# Patient Record
Sex: FEMALE | Race: WHITE
[De-identification: names, ages, dates, MRNs, and addresses within clinical notes are randomized per-mention and may not be internally consistent; named-entity substitution may affect disease eponyms.]

---

## 2022-02-04 ENCOUNTER — APPOINTMENT (OUTPATIENT)
Dept: OPHTHALMOLOGY | Facility: CLINIC | Age: 88
End: 2022-02-04
Payer: MEDICARE

## 2022-02-04 ENCOUNTER — NON-APPOINTMENT (OUTPATIENT)
Age: 88
End: 2022-02-04

## 2022-02-04 PROCEDURE — 92014 COMPRE OPH EXAM EST PT 1/>: CPT

## 2023-02-17 ENCOUNTER — APPOINTMENT (OUTPATIENT)
Dept: OPHTHALMOLOGY | Facility: CLINIC | Age: 89
End: 2023-02-17
Payer: MEDICARE

## 2023-02-17 ENCOUNTER — NON-APPOINTMENT (OUTPATIENT)
Age: 89
End: 2023-02-17

## 2023-02-17 PROCEDURE — 92014 COMPRE OPH EXAM EST PT 1/>: CPT

## 2023-07-14 PROBLEM — Z00.00 ENCOUNTER FOR PREVENTIVE HEALTH EXAMINATION: Status: ACTIVE | Noted: 2023-07-14

## 2025-03-27 ENCOUNTER — INPATIENT (INPATIENT)
Facility: HOSPITAL | Age: 89
LOS: 6 days | Discharge: NURSING, REHAB OR LONG-TERM CARE | End: 2025-04-03
Attending: STUDENT IN AN ORGANIZED HEALTH CARE EDUCATION/TRAINING PROGRAM | Admitting: STUDENT IN AN ORGANIZED HEALTH CARE EDUCATION/TRAINING PROGRAM
Payer: MEDICARE

## 2025-03-27 VITALS — HEART RATE: 68 BPM

## 2025-03-27 PROCEDURE — 99223 1ST HOSP IP/OBS HIGH 75: CPT

## 2025-03-27 RX ORDER — POVIDONE-IODINE 7.5 %
1 SOLUTION, NON-ORAL TOPICAL ONCE
Refills: 0 | Status: DISCONTINUED | OUTPATIENT
Start: 2025-03-27 | End: 2025-03-31

## 2025-03-27 NOTE — H&P ADULT - PROBLEM SELECTOR PLAN 6
- Home med januvia, glimepiride  - ISS + FS q6hrs - Home med januvia, glimepiride  - A1c 9.8 at [facility]  - ISS + FS q6hrs

## 2025-03-27 NOTE — H&P ADULT - TIME BILLING
Direct patient care (interview and examination of patient), discussion with other providers, support staff and/or patient's family members, explaining in detail the diagnosis and plan to the patient and/or family members; review of medical records, ordering diagnostic tests, personally reviewing radiologic and diagnostic tests mentioned above, analyzing results, and documentation. This excludes teaching time to any staff, family, or patient.

## 2025-03-27 NOTE — H&P ADULT - HISTORY OF PRESENT ILLNESS
Patient is a 89F, hard of hearing and uses hearing aid, with PMHx of DM, chronic Afib on Eliquis, HTN, HLD, b/l hip replacements, who comes in after a fall 3/26/25. Patient was using her walker in the bedroom and tripped in her slipper and fell. Since then, she has R leg and knee pain. She is tired due to lack of sleep. Patient denies chest pain, shortness of breath, cough, abdominal pain, diarrhea, constipation, dysuria.

## 2025-03-27 NOTE — CONSULT NOTE ADULT - SUBJECTIVE AND OBJECTIVE BOX
Pt is a 89y Female who presents as transfer from [facility] with R distal femur fx after trip and fall yesterday. Denies head injury, LOC, other injuries. Was unable to ambulate after due to R knee pain. Ambulates with walker at baseline. Denies numbness, tingling, fevers, chills, CP, SOB, N/V/D.    Vital Signs (24 Hrs):  T(C): --  HR: --  BP: --  RR: --  SpO2: --    LABS:      Physical Exam:  General: NAD, pt laying in bed comfortably    RLE:  Skin intact, TTP RLE, knee brace  Compartments soft, compressible  Calves nontender  Motor: +GSC, TA, EHL, FHL  SILT: SPN, DPN, Tib, Saph, Kingsley  +DP    Secondary Assessment:  NC/AT, NTTP of clavicles, NTTP of C-,T-,L-Spine, NTTP of Pelvis  UEs: NTTP of Shoulders, Elbows, Wrists, Hands; NT with AROM/PROM of Shoulders, Elbows, Wrists, Hands; AIN/PIN/Med/Uln/Msc/Rad/Ax intact  LE: Able to SLR, NT with Log Roll, NT with Heel Strike, NTTP of Hip, Knee, Ankle, Foot; NT with AROM/PROM of Hip, Knee, Ankle, Foot; Q/H/Gsc/TA/EHL/FHL intact    Imaging:  XR R Femur: distal femur fx    A/P:  89y Female who presents with R distal femur fx    Pt admitted to medicine team (medicine to medicine transfer)  Plan for OR tomorrow with Dr. De La O  NWB RLE, knee brace  Analgesics  NPO after midnight, except medications  Followup AM labs  Hold chemical DVT ppx after midnight, SCDs OK  Please document medical clearance/optimization for OR  Discussed plan with Dr. De La O who agrees with above

## 2025-03-27 NOTE — H&P ADULT - PROBLEM SELECTOR PLAN 1
- P/w mechanical fall, with RLE pain  - [[facility]] CT R femur 3/26/25 = Extensively comminuted fracture of the distal femur with intra-articular extension through the trochlear sulcus. Moderate knee lipohemarthrosis. Moderate soft tissue and muscular edema surrounding the fracture site. Moderate iliopsoas bursitis.  - [[facility]] Xray pelvis/R femur/R knee 3/26/25 = Mildly displaced, oblique distal right femur fracture. Bilateral total hip arthroplasties without hardware fracture or periprosthetic loosening.  - [[facility]] CXR 3/26/25 = No focal consolidation  - Pain control  - *Please refer to [facility] cardiac consultation note on 3/26/25 for preop evaluation on [facility]  - Orthopedics on board = OR 3/28 - P/w mechanical fall, with RLE pain  - [[facility]] CT R femur 3/26/25 = Extensively comminuted fracture of the distal femur with intra-articular extension through the trochlear sulcus. Moderate knee lipohemarthrosis. Moderate soft tissue and muscular edema surrounding the fracture site. Moderate iliopsoas bursitis.  - [[facility]] Xray pelvis/R femur/R knee 3/26/25 = Mildly displaced, oblique distal right femur fracture. Bilateral total hip arthroplasties without hardware fracture or periprosthetic loosening.  - [[facility]] CXR 3/26/25 = No focal consolidation  - Pain control  - *Please refer to [facility] cardiac consultation note on 3/27/25 for preop evaluation on [facility]. C/w orthopedics resident  - Orthopedics on board = OR 3/28 - P/w mechanical fall, with RLE pain  - [[facility]] CT R femur 3/26/25 = Extensively comminuted fracture of the distal femur with intra-articular extension through the trochlear sulcus. Moderate knee lipohemarthrosis. Moderate soft tissue and muscular edema surrounding the fracture site. Moderate iliopsoas bursitis.  - [[facility]] Xray pelvis/R femur/R knee 3/26/25 = Mildly displaced, oblique distal right femur fracture. Bilateral total hip arthroplasties without hardware fracture or periprosthetic loosening.  - [[facility]] CXR 3/26/25 = No focal consolidation  - Pain control  - *Please refer to [facility] cardiac consultation note on 3/27/25 for preop evaluation on [facility].  - Orthopedics on board = OR 3/28

## 2025-03-27 NOTE — H&P ADULT - NSHPPHYSICALEXAM_GEN_ALL_CORE
GENERAL: NAD  HEAD: Atraumatic, Normocephalic  EYES: EOMI. Conjunctiva and sclera clear  NECK: Supple  CHEST/LUNG: Clear to auscultation bilaterally; No wheeze, rhonchi, rales  HEART: Regular rate and rhythm; No murmurs  ABDOMEN: Soft, Nontender, Nondistended; Bowel sounds present  EXTREMITIES: BL LE edema  NEURO: AAOx3

## 2025-03-27 NOTE — H&P ADULT - PROBLEM SELECTOR PLAN 4
- C/w home ramipril, torsemide, and potassium chloride - C/w home ramipril  - Per [place] note, "Pt reported not taking Torsemide for 11+ days d/t frequency of urination; Bilateral ankle/leg pitting edema/swelling ensued". She refused torsemide at [facility].  - Holding torsemide, and potassium chloride going into the surgery as patient is NPO

## 2025-03-27 NOTE — H&P ADULT - ASSESSMENT
Patient is a 89F, with PMHx of DM, chronic Afib on Eliquis, HTN, HLD, b/l hip replacements, who comes in after a fall 3/26/25. Transferred from [facility] for operation. Patient is a 89F, with PMHx of DM, chronic Afib on Eliquis, HTN, HLD, b/l hip replacements, who comes in after a fall 3/26/25. Transferred from [facility] for operation.    Med list obtained from [facility] notes. Patient does not have her med list right now but she said it was given at [facility], so their records should be accurate.

## 2025-03-28 ENCOUNTER — RESULT REVIEW (OUTPATIENT)
Age: 89
End: 2025-03-28

## 2025-03-28 DIAGNOSIS — I10 ESSENTIAL (PRIMARY) HYPERTENSION: ICD-10-CM

## 2025-03-28 DIAGNOSIS — Z29.9 ENCOUNTER FOR PROPHYLACTIC MEASURES, UNSPECIFIED: ICD-10-CM

## 2025-03-28 DIAGNOSIS — I48.20 CHRONIC ATRIAL FIBRILLATION, UNSPECIFIED: ICD-10-CM

## 2025-03-28 DIAGNOSIS — E78.5 HYPERLIPIDEMIA, UNSPECIFIED: ICD-10-CM

## 2025-03-28 DIAGNOSIS — R26.2 DIFFICULTY IN WALKING, NOT ELSEWHERE CLASSIFIED: ICD-10-CM

## 2025-03-28 DIAGNOSIS — E11.9 TYPE 2 DIABETES MELLITUS WITHOUT COMPLICATIONS: ICD-10-CM

## 2025-03-28 LAB
ALBUMIN SERPL ELPH-MCNC: 2.1 G/DL — LOW (ref 3.3–5)
ALP SERPL-CCNC: 84 U/L — SIGNIFICANT CHANGE UP (ref 40–120)
ALT FLD-CCNC: 15 U/L — SIGNIFICANT CHANGE UP (ref 12–78)
ANION GAP SERPL CALC-SCNC: 2 MMOL/L — LOW (ref 5–17)
ANION GAP SERPL CALC-SCNC: 2 MMOL/L — LOW (ref 5–17)
APTT BLD: 18.7 SEC — LOW (ref 24.5–35.6)
APTT BLD: 25.1 SEC — SIGNIFICANT CHANGE UP (ref 24.5–35.6)
AST SERPL-CCNC: 7 U/L — LOW (ref 15–37)
BILIRUB SERPL-MCNC: 0.4 MG/DL — SIGNIFICANT CHANGE UP (ref 0.2–1.2)
BUN SERPL-MCNC: 19 MG/DL — SIGNIFICANT CHANGE UP (ref 7–23)
BUN SERPL-MCNC: 20 MG/DL — SIGNIFICANT CHANGE UP (ref 7–23)
CALCIUM SERPL-MCNC: 8.9 MG/DL — SIGNIFICANT CHANGE UP (ref 8.5–10.1)
CALCIUM SERPL-MCNC: 9.2 MG/DL — SIGNIFICANT CHANGE UP (ref 8.5–10.1)
CHLORIDE SERPL-SCNC: 98 MMOL/L — SIGNIFICANT CHANGE UP (ref 96–108)
CHLORIDE SERPL-SCNC: 99 MMOL/L — SIGNIFICANT CHANGE UP (ref 96–108)
CO2 SERPL-SCNC: 33 MMOL/L — HIGH (ref 22–31)
CO2 SERPL-SCNC: 33 MMOL/L — HIGH (ref 22–31)
CREAT SERPL-MCNC: 0.67 MG/DL — SIGNIFICANT CHANGE UP (ref 0.5–1.3)
CREAT SERPL-MCNC: 0.84 MG/DL — SIGNIFICANT CHANGE UP (ref 0.5–1.3)
EGFR: 66 ML/MIN/1.73M2 — SIGNIFICANT CHANGE UP
EGFR: 66 ML/MIN/1.73M2 — SIGNIFICANT CHANGE UP
EGFR: 84 ML/MIN/1.73M2 — SIGNIFICANT CHANGE UP
EGFR: 84 ML/MIN/1.73M2 — SIGNIFICANT CHANGE UP
GLUCOSE BLDC GLUCOMTR-MCNC: 141 MG/DL — HIGH (ref 70–99)
GLUCOSE BLDC GLUCOMTR-MCNC: 144 MG/DL — HIGH (ref 70–99)
GLUCOSE BLDC GLUCOMTR-MCNC: 146 MG/DL — HIGH (ref 70–99)
GLUCOSE BLDC GLUCOMTR-MCNC: 178 MG/DL — HIGH (ref 70–99)
GLUCOSE BLDC GLUCOMTR-MCNC: 185 MG/DL — HIGH (ref 70–99)
GLUCOSE SERPL-MCNC: 164 MG/DL — HIGH (ref 70–99)
GLUCOSE SERPL-MCNC: 170 MG/DL — HIGH (ref 70–99)
HCT VFR BLD CALC: 25.5 % — LOW (ref 34.5–45)
HCT VFR BLD CALC: 28.4 % — LOW (ref 34.5–45)
HGB BLD-MCNC: 8.4 G/DL — LOW (ref 11.5–15.5)
HGB BLD-MCNC: 9.3 G/DL — LOW (ref 11.5–15.5)
INR BLD: 0.99 RATIO — SIGNIFICANT CHANGE UP (ref 0.85–1.16)
INR BLD: 1.02 RATIO — SIGNIFICANT CHANGE UP (ref 0.85–1.16)
MCHC RBC-ENTMCNC: 30.3 PG — SIGNIFICANT CHANGE UP (ref 27–34)
MCHC RBC-ENTMCNC: 30.4 PG — SIGNIFICANT CHANGE UP (ref 27–34)
MCHC RBC-ENTMCNC: 32.7 G/DL — SIGNIFICANT CHANGE UP (ref 32–36)
MCHC RBC-ENTMCNC: 32.9 G/DL — SIGNIFICANT CHANGE UP (ref 32–36)
MCV RBC AUTO: 92.1 FL — SIGNIFICANT CHANGE UP (ref 80–100)
MCV RBC AUTO: 92.8 FL — SIGNIFICANT CHANGE UP (ref 80–100)
NRBC BLD AUTO-RTO: 0 /100 WBCS — SIGNIFICANT CHANGE UP (ref 0–0)
NRBC BLD AUTO-RTO: 0 /100 WBCS — SIGNIFICANT CHANGE UP (ref 0–0)
PLATELET # BLD AUTO: 265 K/UL — SIGNIFICANT CHANGE UP (ref 150–400)
PLATELET # BLD AUTO: 292 K/UL — SIGNIFICANT CHANGE UP (ref 150–400)
POTASSIUM SERPL-MCNC: 4.1 MMOL/L — SIGNIFICANT CHANGE UP (ref 3.5–5.3)
POTASSIUM SERPL-MCNC: 4.4 MMOL/L — SIGNIFICANT CHANGE UP (ref 3.5–5.3)
POTASSIUM SERPL-SCNC: 4.1 MMOL/L — SIGNIFICANT CHANGE UP (ref 3.5–5.3)
POTASSIUM SERPL-SCNC: 4.4 MMOL/L — SIGNIFICANT CHANGE UP (ref 3.5–5.3)
PROT SERPL-MCNC: 5.4 GM/DL — LOW (ref 6–8.3)
PROTHROM AB SERPL-ACNC: 11.5 SEC — SIGNIFICANT CHANGE UP (ref 9.9–13.4)
PROTHROM AB SERPL-ACNC: 11.8 SEC — SIGNIFICANT CHANGE UP (ref 9.9–13.4)
RBC # BLD: 2.77 M/UL — LOW (ref 3.8–5.2)
RBC # BLD: 3.06 M/UL — LOW (ref 3.8–5.2)
RBC # FLD: 16.6 % — HIGH (ref 10.3–14.5)
RBC # FLD: 17 % — HIGH (ref 10.3–14.5)
SODIUM SERPL-SCNC: 133 MMOL/L — LOW (ref 135–145)
SODIUM SERPL-SCNC: 134 MMOL/L — LOW (ref 135–145)
WBC # BLD: 8.72 K/UL — SIGNIFICANT CHANGE UP (ref 3.8–10.5)
WBC # BLD: 8.85 K/UL — SIGNIFICANT CHANGE UP (ref 3.8–10.5)
WBC # FLD AUTO: 8.72 K/UL — SIGNIFICANT CHANGE UP (ref 3.8–10.5)
WBC # FLD AUTO: 8.85 K/UL — SIGNIFICANT CHANGE UP (ref 3.8–10.5)

## 2025-03-28 PROCEDURE — 93010 ELECTROCARDIOGRAM REPORT: CPT

## 2025-03-28 PROCEDURE — 73562 X-RAY EXAM OF KNEE 3: CPT | Mod: 26,RT

## 2025-03-28 PROCEDURE — 93306 TTE W/DOPPLER COMPLETE: CPT | Mod: 26

## 2025-03-28 PROCEDURE — 99232 SBSQ HOSP IP/OBS MODERATE 35: CPT

## 2025-03-28 RX ORDER — GLIMEPIRIDE 4 MG/1
1 TABLET ORAL
Refills: 0 | DISCHARGE

## 2025-03-28 RX ORDER — INSULIN LISPRO 100 U/ML
INJECTION, SOLUTION INTRAVENOUS; SUBCUTANEOUS
Refills: 0 | Status: DISCONTINUED | OUTPATIENT
Start: 2025-03-28 | End: 2025-03-31

## 2025-03-28 RX ORDER — INSULIN LISPRO 100 U/ML
INJECTION, SOLUTION INTRAVENOUS; SUBCUTANEOUS AT BEDTIME
Refills: 0 | Status: DISCONTINUED | OUTPATIENT
Start: 2025-03-28 | End: 2025-03-31

## 2025-03-28 RX ORDER — DILTIAZEM HYDROCHLORIDE 240 MG/1
1 TABLET, EXTENDED RELEASE ORAL
Refills: 0 | DISCHARGE

## 2025-03-28 RX ORDER — ATORVASTATIN CALCIUM 80 MG/1
1 TABLET, FILM COATED ORAL
Refills: 0 | DISCHARGE

## 2025-03-28 RX ORDER — SODIUM CHLORIDE 9 G/1000ML
1000 INJECTION, SOLUTION INTRAVENOUS
Refills: 0 | Status: DISCONTINUED | OUTPATIENT
Start: 2025-03-28 | End: 2025-03-28

## 2025-03-28 RX ORDER — GABAPENTIN 400 MG/1
100 CAPSULE ORAL
Refills: 0 | Status: DISCONTINUED | OUTPATIENT
Start: 2025-03-28 | End: 2025-03-31

## 2025-03-28 RX ORDER — ATORVASTATIN CALCIUM 80 MG/1
20 TABLET, FILM COATED ORAL AT BEDTIME
Refills: 0 | Status: DISCONTINUED | OUTPATIENT
Start: 2025-03-28 | End: 2025-03-31

## 2025-03-28 RX ORDER — ENOXAPARIN SODIUM 100 MG/ML
60 INJECTION SUBCUTANEOUS EVERY 12 HOURS
Refills: 0 | Status: COMPLETED | OUTPATIENT
Start: 2025-03-28 | End: 2025-03-30

## 2025-03-28 RX ORDER — GABAPENTIN 400 MG/1
300 CAPSULE ORAL
Refills: 0 | Status: DISCONTINUED | OUTPATIENT
Start: 2025-03-28 | End: 2025-03-31

## 2025-03-28 RX ORDER — RAMIPRIL 2.5 MG/1
1 CAPSULE ORAL
Refills: 0 | DISCHARGE

## 2025-03-28 RX ORDER — GABAPENTIN 400 MG/1
200 CAPSULE ORAL
Refills: 0 | Status: DISCONTINUED | OUTPATIENT
Start: 2025-03-28 | End: 2025-03-31

## 2025-03-28 RX ORDER — DEXTROSE 50 % IN WATER 50 %
50 SYRINGE (ML) INTRAVENOUS ONCE
Refills: 0 | Status: DISCONTINUED | OUTPATIENT
Start: 2025-03-28 | End: 2025-03-31

## 2025-03-28 RX ORDER — ACETAMINOPHEN 500 MG/5ML
650 LIQUID (ML) ORAL EVERY 6 HOURS
Refills: 0 | Status: DISCONTINUED | OUTPATIENT
Start: 2025-03-28 | End: 2025-03-31

## 2025-03-28 RX ORDER — DILTIAZEM HYDROCHLORIDE 240 MG/1
240 TABLET, EXTENDED RELEASE ORAL DAILY
Refills: 0 | Status: DISCONTINUED | OUTPATIENT
Start: 2025-03-28 | End: 2025-03-31

## 2025-03-28 RX ORDER — GLUCAGON 3 MG/1
1 POWDER NASAL ONCE
Refills: 0 | Status: DISCONTINUED | OUTPATIENT
Start: 2025-03-28 | End: 2025-03-31

## 2025-03-28 RX ORDER — HEPARIN SODIUM 1000 [USP'U]/ML
5000 INJECTION INTRAVENOUS; SUBCUTANEOUS ONCE
Refills: 0 | Status: DISCONTINUED | OUTPATIENT
Start: 2025-03-28 | End: 2025-03-28

## 2025-03-28 RX ORDER — LISINOPRIL 5 MG/1
40 TABLET ORAL DAILY
Refills: 0 | Status: DISCONTINUED | OUTPATIENT
Start: 2025-03-28 | End: 2025-03-31

## 2025-03-28 RX ORDER — APIXABAN 2.5 MG/1
1 TABLET, FILM COATED ORAL
Refills: 0 | DISCHARGE

## 2025-03-28 RX ORDER — SITAGLIPTIN 100 MG/1
1 TABLET, FILM COATED ORAL
Refills: 0 | DISCHARGE

## 2025-03-28 RX ORDER — TORSEMIDE 10 MG
40 TABLET ORAL DAILY
Refills: 0 | Status: DISCONTINUED | OUTPATIENT
Start: 2025-03-28 | End: 2025-03-31

## 2025-03-28 RX ORDER — MELATONIN 5 MG
3 TABLET ORAL AT BEDTIME
Refills: 0 | Status: DISCONTINUED | OUTPATIENT
Start: 2025-03-28 | End: 2025-03-31

## 2025-03-28 RX ORDER — VIBEGRON 75 MG/1
1 TABLET, FILM COATED ORAL
Refills: 0 | DISCHARGE

## 2025-03-28 RX ORDER — MAGNESIUM, ALUMINUM HYDROXIDE 200-200 MG
30 TABLET,CHEWABLE ORAL EVERY 4 HOURS
Refills: 0 | Status: DISCONTINUED | OUTPATIENT
Start: 2025-03-28 | End: 2025-03-31

## 2025-03-28 RX ADMIN — INSULIN LISPRO 1: 100 INJECTION, SOLUTION INTRAVENOUS; SUBCUTANEOUS at 10:25

## 2025-03-28 RX ADMIN — ATORVASTATIN CALCIUM 20 MILLIGRAM(S): 80 TABLET, FILM COATED ORAL at 21:29

## 2025-03-28 RX ADMIN — GABAPENTIN 100 MILLIGRAM(S): 400 CAPSULE ORAL at 13:13

## 2025-03-28 RX ADMIN — ENOXAPARIN SODIUM 60 MILLIGRAM(S): 100 INJECTION SUBCUTANEOUS at 21:28

## 2025-03-28 RX ADMIN — GABAPENTIN 200 MILLIGRAM(S): 400 CAPSULE ORAL at 05:34

## 2025-03-28 RX ADMIN — SODIUM CHLORIDE 50 MILLILITER(S): 9 INJECTION, SOLUTION INTRAVENOUS at 05:33

## 2025-03-28 RX ADMIN — DILTIAZEM HYDROCHLORIDE 240 MILLIGRAM(S): 240 TABLET, EXTENDED RELEASE ORAL at 05:34

## 2025-03-28 RX ADMIN — GABAPENTIN 300 MILLIGRAM(S): 400 CAPSULE ORAL at 21:29

## 2025-03-28 NOTE — CHART NOTE - NSCHARTNOTEFT_GEN_A_CORE
After lengthy discussion with patient and Dr De La O, patient elects to hold off on surgery at this time and would like to think about it over the weekend. Patient is pleasant an AxO4, able to cooperate with exam and answer questions appropriately. Plan for conservative management at this time with potential operative intervention in the future.     -May resume diet at this time  -Will continue to follow patient and reassess operative management this weekend  -DVT prophylaxis per primary team, okay to resume today  -NWB RLE, strict bed rest at this time  -BJKI for comfort while in bed  -Discussed with Dr De La O who agrees.

## 2025-03-28 NOTE — PROGRESS NOTE ADULT - SUBJECTIVE AND OBJECTIVE BOX
89F hard of hearing and uses hearing aid with PMHx of DM, chronic Afib on Eliquis, HTN, HLD, b/l hip replacements who was transferred from [facility] for w/ a right femoral fracture after a fall 3/26/25. She is lying in bed in NAD.      MEDICATIONS  (STANDING):  atorvastatin 20 milliGRAM(s) Oral at bedtime  chlorhexidine 2% Cloths 1 Application(s) Topical once  dextrose 50% Injectable 50 Gram(s) IV Push once  dextrose 50% Injectable 50 Gram(s) IV Push once  diltiazem    milliGRAM(s) Oral daily  gabapentin 200 milliGRAM(s) Oral <User Schedule>  gabapentin 100 milliGRAM(s) Oral <User Schedule>  gabapentin 300 milliGRAM(s) Oral <User Schedule>  glucagon  Injectable 1 milliGRAM(s) IntraMuscular once  insulin lispro (ADMELOG) corrective regimen sliding scale   SubCutaneous three times a day before meals  insulin lispro (ADMELOG) corrective regimen sliding scale   SubCutaneous at bedtime  lactated ringers. 1000 milliLiter(s) (50 mL/Hr) IV Continuous <Continuous>  lisinopril 40 milliGRAM(s) Oral daily  povidone iodine 10% Nasal Swab 1 Application(s) Both Nostrils once    MEDICATIONS  (PRN):  acetaminophen     Tablet .. 650 milliGRAM(s) Oral every 6 hours PRN Temp greater or equal to 38C (100.4F), Mild Pain (1 - 3)  aluminum hydroxide/magnesium hydroxide/simethicone Suspension 30 milliLiter(s) Oral every 4 hours PRN Dyspepsia  melatonin 3 milliGRAM(s) Oral at bedtime PRN Insomnia      Allergies    sulfa drugs (Rash)    Intolerances        Vital Signs Last 24 Hrs  T(C): 37.2 (28 Mar 2025 16:35), Max: 37.8 (28 Mar 2025 16:30)  T(F): 98.9 (28 Mar 2025 16:35), Max: 100 (28 Mar 2025 16:30)  HR: 68 (28 Mar 2025 16:35) (59 - 91)  BP: 121/63 (28 Mar 2025 16:35) (109/67 - 127/70)  BP(mean): 82 (28 Mar 2025 16:35) (82 - 82)  RR: 17 (28 Mar 2025 16:35) (16 - 18)  SpO2: 92% (28 Mar 2025 16:35) (91% - 95%)    Parameters below as of 28 Mar 2025 15:50  Patient On (Oxygen Delivery Method): room air        PHYSICAL EXAM:  GENERAL: NAD, well-groomed, well-developed  HEAD:  Atraumatic, Normocephalic  EYES: EOMI, PERRLA   NECK: Supple   NERVOUS SYSTEM:  Alert & Oriented X3   CHEST/LUNG: Clear to auscultation bilaterally; No rales, rhonchi, wheezing, or rubs  HEART: Regular rate and rhythm; No murmurs, rubs, or gallops  ABDOMEN: Soft, Nontender, Nondistended; Bowel sounds present  EXTREMITIES: bilateral LE edema     LABS:                        8.4    8.72  )-----------( 292      ( 28 Mar 2025 03:49 )             25.5     03-28    134[L]  |  99  |  19  ----------------------------<  164[H]  4.1   |  33[H]  |  0.67    Ca    8.9      28 Mar 2025 03:49    TPro  5.4[L]  /  Alb  2.1[L]  /  TBili  0.4  /  DBili  x   /  AST  7[L]  /  ALT  15  /  AlkPhos  84  03-28    PT/INR - ( 28 Mar 2025 03:49 )   PT: 11.8 sec;   INR: 1.02 ratio         PTT - ( 28 Mar 2025 03:49 )  PTT:25.1 sec  Urinalysis Basic - ( 28 Mar 2025 03:49 )    Color: x / Appearance: x / SG: x / pH: x  Gluc: 164 mg/dL / Ketone: x  / Bili: x / Urobili: x   Blood: x / Protein: x / Nitrite: x   Leuk Esterase: x / RBC: x / WBC x   Sq Epi: x / Non Sq Epi: x / Bacteria: x      CAPILLARY BLOOD GLUCOSE      POCT Blood Glucose.: 144 mg/dL (28 Mar 2025 19:18)  POCT Blood Glucose.: 146 mg/dL (28 Mar 2025 12:17)  POCT Blood Glucose.: 185 mg/dL (28 Mar 2025 08:10)  POCT Blood Glucose.: 178 mg/dL (28 Mar 2025 00:53)      RADIOLOGY & ADDITIONAL TESTS:

## 2025-03-28 NOTE — PATIENT PROFILE ADULT - FALL HARM RISK - HARM RISK INTERVENTIONS
Assistance with ambulation/Assistance OOB with selected safe patient handling equipment/Communicate Risk of Fall with Harm to all staff/Discuss with provider need for PT consult/Monitor gait and stability/Reinforce activity limits and safety measures with patient and family/Tailored Fall Risk Interventions/Visual Cue: Yellow wristband and red socks/Bed in lowest position, wheels locked, appropriate side rails in place/Call bell, personal items and telephone in reach/Instruct patient to call for assistance before getting out of bed or chair/Non-slip footwear when patient is out of bed/[place] to call system/Physically safe environment - no spills, clutter or unnecessary equipment/Purposeful Proactive Rounding/Room/bathroom lighting operational, light cord in reach

## 2025-03-28 NOTE — PROGRESS NOTE ADULT - SUBJECTIVE AND OBJECTIVE BOX
Pt seen at bedside this AM. No acute complaints, pain is well managed. Denies numbness, tingling, fevers, chills, CP, SOB, N/V/D.    Vital Signs (24 Hrs):  T(C): 37.2 (03-28-25 @ 04:42), Max: 37.2 (03-28-25 @ 04:42)  HR: 80 (03-28-25 @ 04:42) (66 - 91)  BP: 109/67 (03-28-25 @ 04:42) (109/67 - 119/75)  RR: 18 (03-28-25 @ 04:42) (18 - 18)  SpO2: 94% (03-28-25 @ 04:42) (94% - 95%)  Wt(kg): --    LABS:                          8.4    8.72  )-----------( 292      ( 28 Mar 2025 03:49 )             25.5     03-28    134[L]  |  99  |  19  ----------------------------<  164[H]  4.1   |  33[H]  |  0.67    Ca    8.9      28 Mar 2025 03:49    TPro  5.4[L]  /  Alb  2.1[L]  /  TBili  0.4  /  DBili  x   /  AST  7[L]  /  ALT  15  /  AlkPhos  84  03-28    LIVER FUNCTIONS - ( 28 Mar 2025 03:49 )  Alb: 2.1 g/dL / Pro: 5.4 gm/dL / ALK PHOS: 84 U/L / ALT: 15 U/L / AST: 7 U/L / GGT: x           PT/INR - ( 28 Mar 2025 03:49 )   PT: 11.8 sec;   INR: 1.02 ratio         PTT - ( 28 Mar 2025 03:49 )  PTT:25.1 sec    Physical Exam:  General: NAD, pt laying in bed comfortably    RLE:  Skin intact, TTP RLE, knee brace  Compartments soft, compressible  Calves nontender  Motor: +GSC, TA, EHL, FHL  SILT: SPN, DPN, Tib, Saph, Kingsley  +DP    Imaging:  XR R Femur: distal femur fx    A/P:  89y Female who presents with R distal femur fx    Plan for OR today with Dr. Jaylyn MARTINEZ RLE, knee brace  Analgesics  NPO, except medications  Followup AM labs  Hold chemical DVT ppx, SCDs OK  Discussed plan with Dr. De La O who agrees with above   Pt seen at bedside this AM. No acute complaints, pain is well managed. Denies numbness, tingling, fevers, chills, CP, SOB, N/V/D.    Vital Signs (24 Hrs):  T(C): 37.2 (03-28-25 @ 04:42), Max: 37.2 (03-28-25 @ 04:42)  HR: 80 (03-28-25 @ 04:42) (66 - 91)  BP: 109/67 (03-28-25 @ 04:42) (109/67 - 119/75)  RR: 18 (03-28-25 @ 04:42) (18 - 18)  SpO2: 94% (03-28-25 @ 04:42) (94% - 95%)  Wt(kg): --    LABS:                          8.4    8.72  )-----------( 292      ( 28 Mar 2025 03:49 )             25.5     03-28    134[L]  |  99  |  19  ----------------------------<  164[H]  4.1   |  33[H]  |  0.67    Ca    8.9      28 Mar 2025 03:49    TPro  5.4[L]  /  Alb  2.1[L]  /  TBili  0.4  /  DBili  x   /  AST  7[L]  /  ALT  15  /  AlkPhos  84  03-28    LIVER FUNCTIONS - ( 28 Mar 2025 03:49 )  Alb: 2.1 g/dL / Pro: 5.4 gm/dL / ALK PHOS: 84 U/L / ALT: 15 U/L / AST: 7 U/L / GGT: x           PT/INR - ( 28 Mar 2025 03:49 )   PT: 11.8 sec;   INR: 1.02 ratio         PTT - ( 28 Mar 2025 03:49 )  PTT:25.1 sec    Physical Exam:  General: NAD, pt laying in bed comfortably    RLE:  Skin intact, TTP thigh, knee brace in place  Compartments soft, compressible  Calves nontender  Motor: +GSC, TA, EHL, FHL  SILT: SPN, DPN, Tib, Saph, Kingsley  +DP    Imaging:  XR R Femur: distal femur fx    A/P:  89y Female who presents with R distal femur fx    Plan for OR today with Dr. De La O  NWSATHYA RLE, knee brace  Analgesics  NPO, except medications  Followup AM labs  Hold chemical DVT ppx, SCDs OK  Discussed plan with Dr. De La O who agrees with above

## 2025-03-29 LAB
ANION GAP SERPL CALC-SCNC: 6 MMOL/L — SIGNIFICANT CHANGE UP (ref 5–17)
BUN SERPL-MCNC: 17 MG/DL — SIGNIFICANT CHANGE UP (ref 7–23)
CALCIUM SERPL-MCNC: 9.1 MG/DL — SIGNIFICANT CHANGE UP (ref 8.5–10.1)
CHLORIDE SERPL-SCNC: 99 MMOL/L — SIGNIFICANT CHANGE UP (ref 96–108)
CO2 SERPL-SCNC: 31 MMOL/L — SIGNIFICANT CHANGE UP (ref 22–31)
CREAT SERPL-MCNC: 0.5 MG/DL — SIGNIFICANT CHANGE UP (ref 0.5–1.3)
EGFR: 90 ML/MIN/1.73M2 — SIGNIFICANT CHANGE UP
EGFR: 90 ML/MIN/1.73M2 — SIGNIFICANT CHANGE UP
GLUCOSE BLDC GLUCOMTR-MCNC: 108 MG/DL — HIGH (ref 70–99)
GLUCOSE BLDC GLUCOMTR-MCNC: 117 MG/DL — HIGH (ref 70–99)
GLUCOSE BLDC GLUCOMTR-MCNC: 167 MG/DL — HIGH (ref 70–99)
GLUCOSE BLDC GLUCOMTR-MCNC: 177 MG/DL — HIGH (ref 70–99)
GLUCOSE SERPL-MCNC: 100 MG/DL — HIGH (ref 70–99)
MAGNESIUM SERPL-MCNC: 1.8 MG/DL — SIGNIFICANT CHANGE UP (ref 1.6–2.6)
PHOSPHATE SERPL-MCNC: 2.3 MG/DL — LOW (ref 2.5–4.5)
POTASSIUM SERPL-MCNC: 3.6 MMOL/L — SIGNIFICANT CHANGE UP (ref 3.5–5.3)
POTASSIUM SERPL-SCNC: 3.6 MMOL/L — SIGNIFICANT CHANGE UP (ref 3.5–5.3)
SODIUM SERPL-SCNC: 136 MMOL/L — SIGNIFICANT CHANGE UP (ref 135–145)

## 2025-03-29 PROCEDURE — 99232 SBSQ HOSP IP/OBS MODERATE 35: CPT

## 2025-03-29 RX ORDER — LOPERAMIDE HCL 1 MG/7.5ML
2 SOLUTION ORAL ONCE
Refills: 0 | Status: COMPLETED | OUTPATIENT
Start: 2025-03-29 | End: 2025-03-29

## 2025-03-29 RX ORDER — SOD PHOS DI, MONO/K PHOS MONO 250 MG
2 TABLET ORAL
Refills: 0 | Status: COMPLETED | OUTPATIENT
Start: 2025-03-29 | End: 2025-03-30

## 2025-03-29 RX ADMIN — LISINOPRIL 40 MILLIGRAM(S): 5 TABLET ORAL at 05:20

## 2025-03-29 RX ADMIN — LOPERAMIDE HCL 2 MILLIGRAM(S): 1 SOLUTION ORAL at 23:00

## 2025-03-29 RX ADMIN — INSULIN LISPRO 1: 100 INJECTION, SOLUTION INTRAVENOUS; SUBCUTANEOUS at 17:38

## 2025-03-29 RX ADMIN — DILTIAZEM HYDROCHLORIDE 240 MILLIGRAM(S): 240 TABLET, EXTENDED RELEASE ORAL at 05:19

## 2025-03-29 RX ADMIN — GABAPENTIN 200 MILLIGRAM(S): 400 CAPSULE ORAL at 05:20

## 2025-03-29 RX ADMIN — Medication 40 MILLIGRAM(S): at 05:19

## 2025-03-29 RX ADMIN — ATORVASTATIN CALCIUM 20 MILLIGRAM(S): 80 TABLET, FILM COATED ORAL at 21:33

## 2025-03-29 RX ADMIN — GABAPENTIN 100 MILLIGRAM(S): 400 CAPSULE ORAL at 11:58

## 2025-03-29 RX ADMIN — Medication 2 PACKET(S): at 17:38

## 2025-03-29 RX ADMIN — ENOXAPARIN SODIUM 60 MILLIGRAM(S): 100 INJECTION SUBCUTANEOUS at 05:19

## 2025-03-29 RX ADMIN — ENOXAPARIN SODIUM 60 MILLIGRAM(S): 100 INJECTION SUBCUTANEOUS at 17:38

## 2025-03-29 RX ADMIN — GABAPENTIN 300 MILLIGRAM(S): 400 CAPSULE ORAL at 21:33

## 2025-03-29 NOTE — PROGRESS NOTE ADULT - SUBJECTIVE AND OBJECTIVE BOX
Patient seen and examined at bedside.  No acute complaints at this time. Pain well controlled. Denies chest pain, shortness of breath, nausea or vomiting. Patient will speak to family this weekend and decide if they would like to pursue surgery on Monday.     PE:  Vital Signs Last 24 Hrs  T(C): 36.9 (03-29-25 @ 04:58), Max: 37.8 (03-28-25 @ 16:30)  T(F): 98.4 (03-29-25 @ 04:58), Max: 100 (03-28-25 @ 16:30)  HR: 83 (03-29-25 @ 04:58) (59 - 83)  BP: 129/58 (03-29-25 @ 04:58) (110/57 - 129/58)  BP(mean): 82 (03-28-25 @ 16:35) (82 - 82)  RR: 19 (03-29-25 @ 04:58) (16 - 19)  SpO2: 94% (03-29-25 @ 04:58) (91% - 95%)    General: NAD, resting comfortably in bed  LLE: Skin intact, TTP thigh, knee brace in place  Compartments soft, compressible  Calves nontender  Motor: +GSC, TA, EHL, FHL  SILT: SPN, DPN, Tib, Saph, Kingsley  +DP    Imaging:  XR R Femur: distal femur fx    A/P:  89y Female who presents with R distal femur fx    Patient to discuss with family regarding surgery over the weekend and report back to Ortho  NWB RLE, knee brace  Analgesics  Followup AM labs  OK for chemical DVT ppx, hold after midnight on Sunday  Discussed plan with Dr. De La O who agrees with above

## 2025-03-29 NOTE — PROGRESS NOTE ADULT - SUBJECTIVE AND OBJECTIVE BOX
89F hard of hearing and uses hearing aid with PMHx of DM, chronic Afib on Eliquis, HTN, HLD, b/l hip replacements who was transferred from [facility] for w/ a right femoral fracture after a fall 3/26/25. She is lying in bed in NAD.      MEDICATIONS  (STANDING):  atorvastatin 20 milliGRAM(s) Oral at bedtime  chlorhexidine 2% Cloths 1 Application(s) Topical once  dextrose 50% Injectable 50 Gram(s) IV Push once  dextrose 50% Injectable 50 Gram(s) IV Push once  diltiazem    milliGRAM(s) Oral daily  enoxaparin Injectable 60 milliGRAM(s) SubCutaneous every 12 hours  gabapentin 200 milliGRAM(s) Oral <User Schedule>  gabapentin 100 milliGRAM(s) Oral <User Schedule>  gabapentin 300 milliGRAM(s) Oral <User Schedule>  glucagon  Injectable 1 milliGRAM(s) IntraMuscular once  insulin lispro (ADMELOG) corrective regimen sliding scale   SubCutaneous three times a day before meals  insulin lispro (ADMELOG) corrective regimen sliding scale   SubCutaneous at bedtime  lisinopril 40 milliGRAM(s) Oral daily  potassium phosphate / sodium phosphate Powder (PHOS-NaK) 2 Packet(s) Oral four times a day  povidone iodine 10% Nasal Swab 1 Application(s) Both Nostrils once  torsemide 40 milliGRAM(s) Oral daily    MEDICATIONS  (PRN):  acetaminophen     Tablet .. 650 milliGRAM(s) Oral every 6 hours PRN Temp greater or equal to 38C (100.4F), Mild Pain (1 - 3)  aluminum hydroxide/magnesium hydroxide/simethicone Suspension 30 milliLiter(s) Oral every 4 hours PRN Dyspepsia  melatonin 3 milliGRAM(s) Oral at bedtime PRN Insomnia      Allergies    sulfa drugs (Rash)    Intolerances        Vital Signs Last 24 Hrs  T(C): 36.4 (29 Mar 2025 16:43), Max: 37.6 (28 Mar 2025 23:43)  T(F): 97.5 (29 Mar 2025 16:43), Max: 99.7 (28 Mar 2025 23:43)  HR: 116 (29 Mar 2025 16:43) (62 - 116)  BP: 111/65 (29 Mar 2025 16:43) (111/65 - 129/58)  BP(mean): 80 (29 Mar 2025 16:43) (80 - 80)  RR: 17 (29 Mar 2025 16:43) (17 - 19)  SpO2: 93% (29 Mar 2025 16:43) (93% - 95%)        PHYSICAL EXAM:  GENERAL: NAD, well-groomed, well-developed  HEAD:  Atraumatic, Normocephalic  EYES: EOMI, PERRLA   NECK: Supple   NERVOUS SYSTEM:  Alert & Oriented X3   CHEST/LUNG: Clear to auscultation bilaterally; No rales, rhonchi, wheezing, or rubs  HEART: Regular rate and rhythm; No murmurs, rubs, or gallops  ABDOMEN: Soft, Nontender, Nondistended; Bowel sounds present  EXTREMITIES: bilateral LE edema       LABS:                        8.4    8.72  )-----------( 292      ( 28 Mar 2025 03:49 )             25.5     03-29    136  |  99  |  17  ----------------------------<  100[H]  3.6   |  31  |  0.50    Ca    9.1      29 Mar 2025 06:08  Phos  2.3     03-29  Mg     1.8     03-29    TPro  5.4[L]  /  Alb  2.1[L]  /  TBili  0.4  /  DBili  x   /  AST  7[L]  /  ALT  15  /  AlkPhos  84  03-28    PT/INR - ( 28 Mar 2025 03:49 )   PT: 11.8 sec;   INR: 1.02 ratio         PTT - ( 28 Mar 2025 03:49 )  PTT:25.1 sec  Urinalysis Basic - ( 29 Mar 2025 06:08 )    Color: x / Appearance: x / SG: x / pH: x  Gluc: 100 mg/dL / Ketone: x  / Bili: x / Urobili: x   Blood: x / Protein: x / Nitrite: x   Leuk Esterase: x / RBC: x / WBC x   Sq Epi: x / Non Sq Epi: x / Bacteria: x      CAPILLARY BLOOD GLUCOSE      POCT Blood Glucose.: 167 mg/dL (29 Mar 2025 16:56)  POCT Blood Glucose.: 117 mg/dL (29 Mar 2025 11:10)  POCT Blood Glucose.: 108 mg/dL (29 Mar 2025 07:41)  POCT Blood Glucose.: 141 mg/dL (28 Mar 2025 21:15)  POCT Blood Glucose.: 144 mg/dL (28 Mar 2025 19:18)      RADIOLOGY & ADDITIONAL TESTS:    03-28-25 @ 07:01  -  03-29-25 @ 07:00  --------------------------------------------------------  IN:  Total IN: 0 mL    OUT:    Voided (mL): 700 mL  Total OUT: 700 mL    Total NET: -700 mL      03-29-25 @ 07:01  -  03-29-25 @ 19:13  --------------------------------------------------------  IN:  Total IN: 0 mL    OUT:    Voided (mL): 2000 mL  Total OUT: 2000 mL    Total NET: -2000 mL

## 2025-03-30 LAB
ANION GAP SERPL CALC-SCNC: 4 MMOL/L — LOW (ref 5–17)
BUN SERPL-MCNC: 17 MG/DL — SIGNIFICANT CHANGE UP (ref 7–23)
CALCIUM SERPL-MCNC: 9.2 MG/DL — SIGNIFICANT CHANGE UP (ref 8.5–10.1)
CHLORIDE SERPL-SCNC: 98 MMOL/L — SIGNIFICANT CHANGE UP (ref 96–108)
CO2 SERPL-SCNC: 33 MMOL/L — HIGH (ref 22–31)
CREAT SERPL-MCNC: 0.53 MG/DL — SIGNIFICANT CHANGE UP (ref 0.5–1.3)
EGFR: 88 ML/MIN/1.73M2 — SIGNIFICANT CHANGE UP
EGFR: 88 ML/MIN/1.73M2 — SIGNIFICANT CHANGE UP
GLUCOSE BLDC GLUCOMTR-MCNC: 149 MG/DL — HIGH (ref 70–99)
GLUCOSE BLDC GLUCOMTR-MCNC: 187 MG/DL — HIGH (ref 70–99)
GLUCOSE BLDC GLUCOMTR-MCNC: 200 MG/DL — HIGH (ref 70–99)
GLUCOSE BLDC GLUCOMTR-MCNC: 228 MG/DL — HIGH (ref 70–99)
GLUCOSE SERPL-MCNC: 141 MG/DL — HIGH (ref 70–99)
HCT VFR BLD CALC: 34.7 % — SIGNIFICANT CHANGE UP (ref 34.5–45)
HGB BLD-MCNC: 11.7 G/DL — SIGNIFICANT CHANGE UP (ref 11.5–15.5)
MAGNESIUM SERPL-MCNC: 1.8 MG/DL — SIGNIFICANT CHANGE UP (ref 1.6–2.6)
MCHC RBC-ENTMCNC: 29.5 PG — SIGNIFICANT CHANGE UP (ref 27–34)
MCHC RBC-ENTMCNC: 33.7 G/DL — SIGNIFICANT CHANGE UP (ref 32–36)
MCV RBC AUTO: 87.6 FL — SIGNIFICANT CHANGE UP (ref 80–100)
NRBC BLD AUTO-RTO: 0 /100 WBCS — SIGNIFICANT CHANGE UP (ref 0–0)
PHOSPHATE SERPL-MCNC: 3 MG/DL — SIGNIFICANT CHANGE UP (ref 2.5–4.5)
PLATELET # BLD AUTO: 289 K/UL — SIGNIFICANT CHANGE UP (ref 150–400)
POTASSIUM SERPL-MCNC: 2.9 MMOL/L — CRITICAL LOW (ref 3.5–5.3)
POTASSIUM SERPL-SCNC: 2.9 MMOL/L — CRITICAL LOW (ref 3.5–5.3)
RBC # BLD: 3.96 M/UL — SIGNIFICANT CHANGE UP (ref 3.8–5.2)
RBC # FLD: 16.3 % — HIGH (ref 10.3–14.5)
SODIUM SERPL-SCNC: 135 MMOL/L — SIGNIFICANT CHANGE UP (ref 135–145)
WBC # BLD: 9.94 K/UL — SIGNIFICANT CHANGE UP (ref 3.8–10.5)
WBC # FLD AUTO: 9.94 K/UL — SIGNIFICANT CHANGE UP (ref 3.8–10.5)

## 2025-03-30 PROCEDURE — 93010 ELECTROCARDIOGRAM REPORT: CPT

## 2025-03-30 PROCEDURE — 99232 SBSQ HOSP IP/OBS MODERATE 35: CPT

## 2025-03-30 RX ORDER — LOPERAMIDE HCL 1 MG/7.5ML
4 SOLUTION ORAL ONCE
Refills: 0 | Status: COMPLETED | OUTPATIENT
Start: 2025-03-30 | End: 2025-03-30

## 2025-03-30 RX ADMIN — Medication 40 MILLIEQUIVALENT(S): at 09:38

## 2025-03-30 RX ADMIN — Medication 40 MILLIGRAM(S): at 05:33

## 2025-03-30 RX ADMIN — Medication 40 MILLIEQUIVALENT(S): at 17:36

## 2025-03-30 RX ADMIN — GABAPENTIN 300 MILLIGRAM(S): 400 CAPSULE ORAL at 21:14

## 2025-03-30 RX ADMIN — GABAPENTIN 100 MILLIGRAM(S): 400 CAPSULE ORAL at 12:45

## 2025-03-30 RX ADMIN — INSULIN LISPRO 2: 100 INJECTION, SOLUTION INTRAVENOUS; SUBCUTANEOUS at 12:46

## 2025-03-30 RX ADMIN — Medication 2 PACKET(S): at 03:06

## 2025-03-30 RX ADMIN — Medication 40 MILLIEQUIVALENT(S): at 21:14

## 2025-03-30 RX ADMIN — Medication 650 MILLIGRAM(S): at 09:38

## 2025-03-30 RX ADMIN — ENOXAPARIN SODIUM 60 MILLIGRAM(S): 100 INJECTION SUBCUTANEOUS at 17:36

## 2025-03-30 RX ADMIN — GABAPENTIN 200 MILLIGRAM(S): 400 CAPSULE ORAL at 05:32

## 2025-03-30 RX ADMIN — LOPERAMIDE HCL 4 MILLIGRAM(S): 1 SOLUTION ORAL at 17:36

## 2025-03-30 RX ADMIN — ATORVASTATIN CALCIUM 20 MILLIGRAM(S): 80 TABLET, FILM COATED ORAL at 21:14

## 2025-03-30 RX ADMIN — Medication 2 PACKET(S): at 05:34

## 2025-03-30 RX ADMIN — ENOXAPARIN SODIUM 60 MILLIGRAM(S): 100 INJECTION SUBCUTANEOUS at 05:33

## 2025-03-30 RX ADMIN — LISINOPRIL 40 MILLIGRAM(S): 5 TABLET ORAL at 05:33

## 2025-03-30 RX ADMIN — Medication 2 PACKET(S): at 12:46

## 2025-03-30 RX ADMIN — INSULIN LISPRO 1: 100 INJECTION, SOLUTION INTRAVENOUS; SUBCUTANEOUS at 17:36

## 2025-03-30 RX ADMIN — DILTIAZEM HYDROCHLORIDE 240 MILLIGRAM(S): 240 TABLET, EXTENDED RELEASE ORAL at 05:33

## 2025-03-30 NOTE — PROGRESS NOTE ADULT - SUBJECTIVE AND OBJECTIVE BOX
Patient seen and examined at bedside.  No acute complaints at this time. Pain well controlled. Denies chest pain, shortness of breath, nausea or vomiting. Patient remains unsure if they would like to pursue surgery on Monday. Will speak with family today and come to decision.    PE:  VITAL SIGNS:  T(C): 36.6 (03-30-25 @ 04:45), Max: 37.1 (03-29-25 @ 10:38)  HR: 76 (03-30-25 @ 04:45) (62 - 116)  BP: 119/65 (03-30-25 @ 04:45) (111/65 - 121/75)  RR: 18 (03-30-25 @ 04:45) (17 - 18)  SpO2: 95% (03-30-25 @ 04:45) (93% - 95%)    General: NAD, resting comfortably in bed  LLE: Skin intact, TTP thigh, knee brace in place  Compartments soft, compressible  Calves nontender  Motor: +GSC, TA, EHL, FHL  SILT: SPN, DPN, Tib, Saph, Kingsley  +DP    LABS:                        11.7   9.94  )-----------( 289      ( 30 Mar 2025 06:45 )             34.7     03-30    135  |  98  |  17  ----------------------------<  141[H]  2.9[LL]   |  33[H]  |  0.53    Ca    9.2      30 Mar 2025 06:45  Phos  2.3     03-29  Mg     1.8     03-29        Urinalysis Basic - ( 30 Mar 2025 06:45 )    Color: x / Appearance: x / SG: x / pH: x  Gluc: 141 mg/dL / Ketone: x  / Bili: x / Urobili: x   Blood: x / Protein: x / Nitrite: x   Leuk Esterase: x / RBC: x / WBC x   Sq Epi: x / Non Sq Epi: x / Bacteria: x    Imaging:  XR R Femur: distal femur fx    A/P:  89y Female who presents with R distal femur fx    Patient to discuss with family regarding surgery today and report back to Ortho, will follow up with patient this afternoon for decision  NWB RLE, knee brace  Analgesics as needed  Followup AM labs  OK for chemical DVT ppx, hold after midnight tonight for possible OR tomorrow  NPO except medications after midnight tonight, IVF while NPO  Discussed plan with Dr. De La O who agrees with above

## 2025-03-30 NOTE — PROVIDER CONTACT NOTE (CRITICAL VALUE NOTIFICATION) - DATE AND TIME:
Full note dictated. Dx:  Delirium    Rec:  1). He is hallucinating and becoming agitated, putting himself at risk. Multifactorial in terms of cause but he is not on lots of meds that are typically associated with delirium but agree with idea of reducing or limiting opiates. I will schedule seroquel 100 HS and 25 BID. Asked RN to let me know how that is working and I can adjust.  I will also add a prn dose in case he needs something. I will continue to follow.      David Cervantes MD 30-Mar-2025 08:05 30-Mar-2025 08:25

## 2025-03-30 NOTE — PROGRESS NOTE ADULT - SUBJECTIVE AND OBJECTIVE BOX
89F hard of hearing and uses hearing aid with PMHx of DM, chronic Afib on Eliquis, HTN, HLD, b/l hip replacements who was transferred from [facility] for w/ a right femoral fracture after a fall 3/26/25. She is lying in bed in NAD.     MEDICATIONS  (STANDING):  atorvastatin 20 milliGRAM(s) Oral at bedtime  chlorhexidine 2% Cloths 1 Application(s) Topical once  dextrose 50% Injectable 50 Gram(s) IV Push once  dextrose 50% Injectable 50 Gram(s) IV Push once  diltiazem    milliGRAM(s) Oral daily  gabapentin 200 milliGRAM(s) Oral <User Schedule>  gabapentin 100 milliGRAM(s) Oral <User Schedule>  gabapentin 300 milliGRAM(s) Oral <User Schedule>  glucagon  Injectable 1 milliGRAM(s) IntraMuscular once  insulin lispro (ADMELOG) corrective regimen sliding scale   SubCutaneous three times a day before meals  insulin lispro (ADMELOG) corrective regimen sliding scale   SubCutaneous at bedtime  lisinopril 40 milliGRAM(s) Oral daily  potassium chloride    Tablet ER 40 milliEquivalent(s) Oral every 4 hours  povidone iodine 10% Nasal Swab 1 Application(s) Both Nostrils once  torsemide 40 milliGRAM(s) Oral daily    MEDICATIONS  (PRN):  acetaminophen     Tablet .. 650 milliGRAM(s) Oral every 6 hours PRN Temp greater or equal to 38C (100.4F), Mild Pain (1 - 3)  aluminum hydroxide/magnesium hydroxide/simethicone Suspension 30 milliLiter(s) Oral every 4 hours PRN Dyspepsia  melatonin 3 milliGRAM(s) Oral at bedtime PRN Insomnia      Allergies    sulfa drugs (Rash)    Intolerances        Vital Signs Last 24 Hrs  T(C): 37.1 (30 Mar 2025 16:43), Max: 37.1 (30 Mar 2025 16:43)  T(F): 98.7 (30 Mar 2025 16:43), Max: 98.7 (30 Mar 2025 16:43)  HR: 69 (30 Mar 2025 16:43) (68 - 76)  BP: 119/65 (30 Mar 2025 16:43) (106/55 - 121/75)  BP(mean): --  RR: 18 (30 Mar 2025 16:43) (18 - 18)  SpO2: 95% (30 Mar 2025 16:43) (94% - 95%)        PHYSICAL EXAM:  GENERAL: NAD, well-groomed, well-developed  HEAD:  Atraumatic, Normocephalic  EYES: EOMI, PERRLA   NECK: Supple   NERVOUS SYSTEM:  Alert & Oriented X3   CHEST/LUNG: Clear to auscultation bilaterally; No rales, rhonchi, wheezing, or rubs  HEART: Regular rate and rhythm; No murmurs, rubs, or gallops  ABDOMEN: Soft, Nontender, Nondistended; Bowel sounds present  EXTREMITIES: bilateral LE edema    LABS:                        11.7   9.94  )-----------( 289      ( 30 Mar 2025 06:45 )             34.7     03-30    135  |  98  |  17  ----------------------------<  141[H]  2.9[LL]   |  33[H]  |  0.53    Ca    9.2      30 Mar 2025 06:45  Phos  3.0     03-30  Mg     1.8     03-30        Urinalysis Basic - ( 30 Mar 2025 06:45 )    Color: x / Appearance: x / SG: x / pH: x  Gluc: 141 mg/dL / Ketone: x  / Bili: x / Urobili: x   Blood: x / Protein: x / Nitrite: x   Leuk Esterase: x / RBC: x / WBC x   Sq Epi: x / Non Sq Epi: x / Bacteria: x      CAPILLARY BLOOD GLUCOSE      POCT Blood Glucose.: 187 mg/dL (30 Mar 2025 16:56)  POCT Blood Glucose.: 228 mg/dL (30 Mar 2025 11:54)  POCT Blood Glucose.: 149 mg/dL (30 Mar 2025 07:57)  POCT Blood Glucose.: 177 mg/dL (29 Mar 2025 21:26)      RADIOLOGY & ADDITIONAL TESTS:    03-29-25 @ 07:01  -  03-30-25 @ 07:00  --------------------------------------------------------  IN:  Total IN: 0 mL    OUT:    Voided (mL): 2400 mL  Total OUT: 2400 mL    Total NET: -2400 mL      03-30-25 @ 07:01  -  03-30-25 @ 19:45  --------------------------------------------------------  IN:    Oral Fluid: 440 mL  Total IN: 440 mL    OUT:    Voided (mL): 1000 mL  Total OUT: 1000 mL    Total NET: -560 mL

## 2025-03-31 ENCOUNTER — TRANSCRIPTION ENCOUNTER (OUTPATIENT)
Age: 89
End: 2025-03-31

## 2025-03-31 LAB
ANION GAP SERPL CALC-SCNC: 4 MMOL/L — LOW (ref 5–17)
ANION GAP SERPL CALC-SCNC: 8 MMOL/L — SIGNIFICANT CHANGE UP (ref 5–17)
APTT BLD: 28.4 SEC — SIGNIFICANT CHANGE UP (ref 24.5–35.6)
BUN SERPL-MCNC: 19 MG/DL — SIGNIFICANT CHANGE UP (ref 7–23)
BUN SERPL-MCNC: 20 MG/DL — SIGNIFICANT CHANGE UP (ref 7–23)
CALCIUM SERPL-MCNC: 8.7 MG/DL — SIGNIFICANT CHANGE UP (ref 8.5–10.1)
CALCIUM SERPL-MCNC: 8.9 MG/DL — SIGNIFICANT CHANGE UP (ref 8.5–10.1)
CHLORIDE SERPL-SCNC: 101 MMOL/L — SIGNIFICANT CHANGE UP (ref 96–108)
CHLORIDE SERPL-SCNC: 98 MMOL/L — SIGNIFICANT CHANGE UP (ref 96–108)
CO2 SERPL-SCNC: 30 MMOL/L — SIGNIFICANT CHANGE UP (ref 22–31)
CO2 SERPL-SCNC: 34 MMOL/L — HIGH (ref 22–31)
CREAT SERPL-MCNC: 0.53 MG/DL — SIGNIFICANT CHANGE UP (ref 0.5–1.3)
CREAT SERPL-MCNC: 0.67 MG/DL — SIGNIFICANT CHANGE UP (ref 0.5–1.3)
EGFR: 84 ML/MIN/1.73M2 — SIGNIFICANT CHANGE UP
EGFR: 84 ML/MIN/1.73M2 — SIGNIFICANT CHANGE UP
EGFR: 88 ML/MIN/1.73M2 — SIGNIFICANT CHANGE UP
EGFR: 88 ML/MIN/1.73M2 — SIGNIFICANT CHANGE UP
GLUCOSE BLDC GLUCOMTR-MCNC: 152 MG/DL — HIGH (ref 70–99)
GLUCOSE BLDC GLUCOMTR-MCNC: 155 MG/DL — HIGH (ref 70–99)
GLUCOSE BLDC GLUCOMTR-MCNC: 170 MG/DL — HIGH (ref 70–99)
GLUCOSE BLDC GLUCOMTR-MCNC: 241 MG/DL — HIGH (ref 70–99)
GLUCOSE BLDC GLUCOMTR-MCNC: 326 MG/DL — HIGH (ref 70–99)
GLUCOSE SERPL-MCNC: 152 MG/DL — HIGH (ref 70–99)
GLUCOSE SERPL-MCNC: 235 MG/DL — HIGH (ref 70–99)
HCT VFR BLD CALC: 32 % — LOW (ref 34.5–45)
HCT VFR BLD CALC: 32.4 % — LOW (ref 34.5–45)
HGB BLD-MCNC: 10.7 G/DL — LOW (ref 11.5–15.5)
HGB BLD-MCNC: 11 G/DL — LOW (ref 11.5–15.5)
INR BLD: 0.99 RATIO — SIGNIFICANT CHANGE UP (ref 0.85–1.16)
MAGNESIUM SERPL-MCNC: 1.7 MG/DL — SIGNIFICANT CHANGE UP (ref 1.6–2.6)
MCHC RBC-ENTMCNC: 30.6 PG — SIGNIFICANT CHANGE UP (ref 27–34)
MCHC RBC-ENTMCNC: 30.8 PG — SIGNIFICANT CHANGE UP (ref 27–34)
MCHC RBC-ENTMCNC: 33 G/DL — SIGNIFICANT CHANGE UP (ref 32–36)
MCHC RBC-ENTMCNC: 34.4 G/DL — SIGNIFICANT CHANGE UP (ref 32–36)
MCV RBC AUTO: 88.9 FL — SIGNIFICANT CHANGE UP (ref 80–100)
MCV RBC AUTO: 93.4 FL — SIGNIFICANT CHANGE UP (ref 80–100)
NRBC BLD AUTO-RTO: 0 /100 WBCS — SIGNIFICANT CHANGE UP (ref 0–0)
NRBC BLD AUTO-RTO: 0 /100 WBCS — SIGNIFICANT CHANGE UP (ref 0–0)
PHOSPHATE SERPL-MCNC: 2.4 MG/DL — LOW (ref 2.5–4.5)
PLATELET # BLD AUTO: 275 K/UL — SIGNIFICANT CHANGE UP (ref 150–400)
PLATELET # BLD AUTO: 275 K/UL — SIGNIFICANT CHANGE UP (ref 150–400)
POTASSIUM SERPL-MCNC: 4.6 MMOL/L — SIGNIFICANT CHANGE UP (ref 3.5–5.3)
POTASSIUM SERPL-MCNC: 4.7 MMOL/L — SIGNIFICANT CHANGE UP (ref 3.5–5.3)
POTASSIUM SERPL-SCNC: 4.6 MMOL/L — SIGNIFICANT CHANGE UP (ref 3.5–5.3)
POTASSIUM SERPL-SCNC: 4.7 MMOL/L — SIGNIFICANT CHANGE UP (ref 3.5–5.3)
PROTHROM AB SERPL-ACNC: 11.2 SEC — SIGNIFICANT CHANGE UP (ref 9.9–13.4)
RBC # BLD: 3.47 M/UL — LOW (ref 3.8–5.2)
RBC # BLD: 3.6 M/UL — LOW (ref 3.8–5.2)
RBC # FLD: 16 % — HIGH (ref 10.3–14.5)
RBC # FLD: 16.2 % — HIGH (ref 10.3–14.5)
SODIUM SERPL-SCNC: 136 MMOL/L — SIGNIFICANT CHANGE UP (ref 135–145)
SODIUM SERPL-SCNC: 139 MMOL/L — SIGNIFICANT CHANGE UP (ref 135–145)
WBC # BLD: 12.7 K/UL — HIGH (ref 3.8–10.5)
WBC # BLD: 8.82 K/UL — SIGNIFICANT CHANGE UP (ref 3.8–10.5)
WBC # FLD AUTO: 12.7 K/UL — HIGH (ref 3.8–10.5)
WBC # FLD AUTO: 8.82 K/UL — SIGNIFICANT CHANGE UP (ref 3.8–10.5)

## 2025-03-31 PROCEDURE — 27447 TOTAL KNEE ARTHROPLASTY: CPT | Mod: RT

## 2025-03-31 PROCEDURE — 99232 SBSQ HOSP IP/OBS MODERATE 35: CPT

## 2025-03-31 PROCEDURE — 73560 X-RAY EXAM OF KNEE 1 OR 2: CPT | Mod: 26,RT

## 2025-03-31 DEVICE — IMPLANTABLE DEVICE: Type: IMPLANTABLE DEVICE | Site: RIGHT | Status: FUNCTIONAL

## 2025-03-31 DEVICE — STRYKER PIN 1/8 X 3.5" LONG: Type: IMPLANTABLE DEVICE | Site: RIGHT | Status: FUNCTIONAL

## 2025-03-31 DEVICE — IMP TIB AUGMENT SYMM CONE SZ B: Type: IMPLANTABLE DEVICE | Site: RIGHT | Status: FUNCTIONAL

## 2025-03-31 DEVICE — CABLE/SLV SET BEAD MED CABLE 2MM: Type: IMPLANTABLE DEVICE | Site: RIGHT | Status: FUNCTIONAL

## 2025-03-31 DEVICE — STEM CMNTD TRIATHLON TS 12X50MM: Type: IMPLANTABLE DEVICE | Site: RIGHT | Status: FUNCTIONAL

## 2025-03-31 DEVICE — KIT PREP IM TOT HIP: Type: IMPLANTABLE DEVICE | Site: RIGHT | Status: FUNCTIONAL

## 2025-03-31 DEVICE — CEMENT SIMPLEX P 40GM: Type: IMPLANTABLE DEVICE | Site: RIGHT | Status: FUNCTIONAL

## 2025-03-31 RX ORDER — MAGNESIUM HYDROXIDE 400 MG/5ML
30 SUSPENSION ORAL DAILY
Refills: 0 | Status: DISCONTINUED | OUTPATIENT
Start: 2025-03-31 | End: 2025-04-03

## 2025-03-31 RX ORDER — TORSEMIDE 10 MG
40 TABLET ORAL DAILY
Refills: 0 | Status: DISCONTINUED | OUTPATIENT
Start: 2025-04-01 | End: 2025-04-03

## 2025-03-31 RX ORDER — DILTIAZEM HYDROCHLORIDE 240 MG/1
240 TABLET, EXTENDED RELEASE ORAL DAILY
Refills: 0 | Status: DISCONTINUED | OUTPATIENT
Start: 2025-03-31 | End: 2025-04-03

## 2025-03-31 RX ORDER — ONDANSETRON HCL/PF 4 MG/2 ML
4 VIAL (ML) INJECTION ONCE
Refills: 0 | Status: DISCONTINUED | OUTPATIENT
Start: 2025-03-31 | End: 2025-03-31

## 2025-03-31 RX ORDER — DEXTROSE 50 % IN WATER 50 %
12.5 SYRINGE (ML) INTRAVENOUS ONCE
Refills: 0 | Status: DISCONTINUED | OUTPATIENT
Start: 2025-03-31 | End: 2025-04-01

## 2025-03-31 RX ORDER — HYDROMORPHONE/SOD CHLOR,ISO/PF 2 MG/10 ML
0.5 SYRINGE (ML) INJECTION
Refills: 0 | Status: DISCONTINUED | OUTPATIENT
Start: 2025-03-31 | End: 2025-03-31

## 2025-03-31 RX ORDER — SOD PHOS DI, MONO/K PHOS MONO 250 MG
2 TABLET ORAL ONCE
Refills: 0 | Status: COMPLETED | OUTPATIENT
Start: 2025-03-31 | End: 2025-03-31

## 2025-03-31 RX ORDER — GABAPENTIN 400 MG/1
100 CAPSULE ORAL
Refills: 0 | Status: DISCONTINUED | OUTPATIENT
Start: 2025-03-31 | End: 2025-04-03

## 2025-03-31 RX ORDER — HYDROMORPHONE/SOD CHLOR,ISO/PF 2 MG/10 ML
1 SYRINGE (ML) INJECTION
Refills: 0 | Status: DISCONTINUED | OUTPATIENT
Start: 2025-03-31 | End: 2025-03-31

## 2025-03-31 RX ORDER — LISINOPRIL 5 MG/1
40 TABLET ORAL DAILY
Refills: 0 | Status: DISCONTINUED | OUTPATIENT
Start: 2025-03-31 | End: 2025-04-03

## 2025-03-31 RX ORDER — ATORVASTATIN CALCIUM 80 MG/1
20 TABLET, FILM COATED ORAL AT BEDTIME
Refills: 0 | Status: DISCONTINUED | OUTPATIENT
Start: 2025-03-31 | End: 2025-04-03

## 2025-03-31 RX ORDER — POLYETHYLENE GLYCOL 3350 17 G/17G
17 POWDER, FOR SOLUTION ORAL AT BEDTIME
Refills: 0 | Status: DISCONTINUED | OUTPATIENT
Start: 2025-03-31 | End: 2025-04-03

## 2025-03-31 RX ORDER — OXYCODONE HYDROCHLORIDE 30 MG/1
2.5 TABLET ORAL EVERY 4 HOURS
Refills: 0 | Status: DISCONTINUED | OUTPATIENT
Start: 2025-03-31 | End: 2025-04-03

## 2025-03-31 RX ORDER — TRAMADOL HYDROCHLORIDE 50 MG/1
50 TABLET, FILM COATED ORAL EVERY 6 HOURS
Refills: 0 | Status: DISCONTINUED | OUTPATIENT
Start: 2025-03-31 | End: 2025-04-03

## 2025-03-31 RX ORDER — DEXTROSE 50 % IN WATER 50 %
15 SYRINGE (ML) INTRAVENOUS ONCE
Refills: 0 | Status: DISCONTINUED | OUTPATIENT
Start: 2025-03-31 | End: 2025-04-01

## 2025-03-31 RX ORDER — SENNA 187 MG
2 TABLET ORAL AT BEDTIME
Refills: 0 | Status: DISCONTINUED | OUTPATIENT
Start: 2025-03-31 | End: 2025-04-03

## 2025-03-31 RX ORDER — SODIUM CHLORIDE 9 G/1000ML
1000 INJECTION, SOLUTION INTRAVENOUS
Refills: 0 | Status: DISCONTINUED | OUTPATIENT
Start: 2025-03-31 | End: 2025-03-31

## 2025-03-31 RX ORDER — GABAPENTIN 400 MG/1
200 CAPSULE ORAL
Refills: 0 | Status: DISCONTINUED | OUTPATIENT
Start: 2025-03-31 | End: 2025-04-03

## 2025-03-31 RX ORDER — INSULIN LISPRO 100 U/ML
INJECTION, SOLUTION INTRAVENOUS; SUBCUTANEOUS
Refills: 0 | Status: DISCONTINUED | OUTPATIENT
Start: 2025-03-31 | End: 2025-04-01

## 2025-03-31 RX ORDER — DEXTROSE 50 % IN WATER 50 %
25 SYRINGE (ML) INTRAVENOUS ONCE
Refills: 0 | Status: DISCONTINUED | OUTPATIENT
Start: 2025-03-31 | End: 2025-04-01

## 2025-03-31 RX ORDER — INSULIN LISPRO 100 U/ML
INJECTION, SOLUTION INTRAVENOUS; SUBCUTANEOUS AT BEDTIME
Refills: 0 | Status: DISCONTINUED | OUTPATIENT
Start: 2025-03-31 | End: 2025-04-01

## 2025-03-31 RX ORDER — ACETAMINOPHEN 500 MG/5ML
975 LIQUID (ML) ORAL EVERY 8 HOURS
Refills: 0 | Status: DISCONTINUED | OUTPATIENT
Start: 2025-03-31 | End: 2025-04-03

## 2025-03-31 RX ORDER — CEFAZOLIN SODIUM IN 0.9 % NACL 3 G/100 ML
2000 INTRAVENOUS SOLUTION, PIGGYBACK (ML) INTRAVENOUS EVERY 8 HOURS
Refills: 0 | Status: COMPLETED | OUTPATIENT
Start: 2025-03-31 | End: 2025-04-01

## 2025-03-31 RX ORDER — GLUCAGON 3 MG/1
1 POWDER NASAL ONCE
Refills: 0 | Status: DISCONTINUED | OUTPATIENT
Start: 2025-03-31 | End: 2025-04-01

## 2025-03-31 RX ORDER — OXYCODONE HYDROCHLORIDE 30 MG/1
5 TABLET ORAL EVERY 4 HOURS
Refills: 0 | Status: DISCONTINUED | OUTPATIENT
Start: 2025-03-31 | End: 2025-04-03

## 2025-03-31 RX ORDER — SODIUM CHLORIDE 9 G/1000ML
1000 INJECTION, SOLUTION INTRAVENOUS
Refills: 0 | Status: DISCONTINUED | OUTPATIENT
Start: 2025-03-31 | End: 2025-04-01

## 2025-03-31 RX ORDER — GABAPENTIN 400 MG/1
300 CAPSULE ORAL
Refills: 0 | Status: DISCONTINUED | OUTPATIENT
Start: 2025-03-31 | End: 2025-04-03

## 2025-03-31 RX ORDER — ENOXAPARIN SODIUM 100 MG/ML
40 INJECTION SUBCUTANEOUS EVERY 24 HOURS
Refills: 0 | Status: DISCONTINUED | OUTPATIENT
Start: 2025-04-01 | End: 2025-04-01

## 2025-03-31 RX ORDER — ACETAMINOPHEN 500 MG/5ML
1000 LIQUID (ML) ORAL ONCE
Refills: 0 | Status: DISCONTINUED | OUTPATIENT
Start: 2025-03-31 | End: 2025-04-03

## 2025-03-31 RX ADMIN — Medication 75 MILLILITER(S): at 22:58

## 2025-03-31 RX ADMIN — Medication 975 MILLIGRAM(S): at 23:00

## 2025-03-31 RX ADMIN — INSULIN LISPRO 2: 100 INJECTION, SOLUTION INTRAVENOUS; SUBCUTANEOUS at 22:21

## 2025-03-31 RX ADMIN — GABAPENTIN 200 MILLIGRAM(S): 400 CAPSULE ORAL at 05:38

## 2025-03-31 RX ADMIN — ATORVASTATIN CALCIUM 20 MILLIGRAM(S): 80 TABLET, FILM COATED ORAL at 22:20

## 2025-03-31 RX ADMIN — POLYETHYLENE GLYCOL 3350 17 GRAM(S): 17 POWDER, FOR SOLUTION ORAL at 22:20

## 2025-03-31 RX ADMIN — Medication 2 TABLET(S): at 22:20

## 2025-03-31 RX ADMIN — Medication 2 PACKET(S): at 10:35

## 2025-03-31 RX ADMIN — GABAPENTIN 300 MILLIGRAM(S): 400 CAPSULE ORAL at 22:52

## 2025-03-31 RX ADMIN — Medication 40 MILLIGRAM(S): at 05:38

## 2025-03-31 RX ADMIN — Medication 975 MILLIGRAM(S): at 22:20

## 2025-03-31 RX ADMIN — DILTIAZEM HYDROCHLORIDE 240 MILLIGRAM(S): 240 TABLET, EXTENDED RELEASE ORAL at 05:38

## 2025-03-31 RX ADMIN — Medication 100 MILLIGRAM(S): at 22:20

## 2025-03-31 NOTE — DISCHARGE NOTE PROVIDER - DETAILS OF MALNUTRITION DIAGNOSIS/DIAGNOSES
This patient has been assessed with a concern for Malnutrition and was treated during this hospitalization for the following Nutrition diagnosis/diagnoses:     -  03/31/2025: Moderate protein-calorie malnutrition

## 2025-03-31 NOTE — DIETITIAN INITIAL EVALUATION ADULT - PROBLEM SELECTOR PLAN 1
- P/w mechanical fall, with RLE pain  - [[facility]] CT R femur 3/26/25 = Extensively comminuted fracture of the distal femur with intra-articular extension through the trochlear sulcus. Moderate knee lipohemarthrosis. Moderate soft tissue and muscular edema surrounding the fracture site. Moderate iliopsoas bursitis.  - [[facility]] Xray pelvis/R femur/R knee 3/26/25 = Mildly displaced, oblique distal right femur fracture. Bilateral total hip arthroplasties without hardware fracture or periprosthetic loosening.  - [[facility]] CXR 3/26/25 = No focal consolidation  - Pain control  - *Please refer to [facility] cardiac consultation note on 3/27/25 for preop evaluation on [facility].  - Orthopedics on board = OR 3/28

## 2025-03-31 NOTE — DIETITIAN INITIAL EVALUATION ADULT - NS FNS WEIGHT CHANGE REASON
Pt is unsure of recent wt. loss, estimated wt. ~ 140 LBS PTA.  Per pt., she used to weigh 170 LBS many years ago.  Per pt., she used to be 5'8", estimated height: 5'7"/unintentional

## 2025-03-31 NOTE — DIETITIAN INITIAL EVALUATION ADULT - PHYSCIAL ASSESSMENT
Pt c visible moderate temple, clavicle, shoulder(muscle) depletion and moderate orbital, buccal(fat ) depletion/underweight/emaciated/debilitated

## 2025-03-31 NOTE — DISCHARGE NOTE PROVIDER - NSDCFUADDINST_GEN_ALL_CORE_FT
Discharge Instructions for Distal Femur Replacement    1.  Diet: Resume previous diet  2. Activity: WBAT, Rolling walker, Daily PT. Walk plenty.   3. Call with: fever over 101, wound redness, drainage or open area, calf pain/calf swelling  4. Wound Care: Keep CHERRY Dressing on until POD7, then can replace with Aquacel bandage until seen in office.  5. OK to Shower with Aquacel; Must be an Aquacel bandage to shower.  Avoid direct water beating on bandage.  Continue ICE packs to knee.  6. DVT PE Prophylaxis: Per Primary team. See med rec for further instructions.  7. Follow Up: Dr. De La O in office in 14 days;  Call to schedule appointment.  8. Pain medications:  If going home, eRX sent to your pharmacy for .  9. Sutures: Nylons to be removed by nurse or in office on POD 14.

## 2025-03-31 NOTE — DISCHARGE NOTE PROVIDER - YES NO FOR MLM POSITIVE OR NEGATIVE COVID RESULT
, Call was placed to patient's given phone number to arrange for patient to  instructions and/or prescription./Patient's chart was referred to charge nurse/supervisor for disposition of prescription and/or discharge instructions./Physician notified

## 2025-03-31 NOTE — DIETITIAN INITIAL EVALUATION ADULT - REASON INDICATOR FOR ASSESSMENT
RN consult for pressure ulcer stage 2 or greater (stage III of right buttock noted on pt. profile) RN consults for RN consult for MST score 2 or > & pressure ulcer stage 2 or greater (stage III of right buttock noted on pt. profile)

## 2025-03-31 NOTE — DISCHARGE NOTE PROVIDER - NSDCMRMEDTOKEN_GEN_ALL_CORE_FT
atorvastatin 20 mg oral tablet: 1 tab(s) orally once a day (at bedtime)  DilTIAZem (Eqv-Cardizem CD) 240 mg/24 hours oral capsule, extended release: 1 cap(s) orally once a day  Eliquis 5 mg oral tablet: 1 tab(s) orally 2 times a day  ESTRADIOL 0.1MG/GM VAGINAL CREAM: INSERT 1G VAGINALLY 3 TIMES WEEKLY AT BEDTIME  gabapentin 100 mg oral capsule: 2 cap(s) orally in AM, 1 cap midday, 3 caps at bedtime  Gemtesa 75 mg oral tablet: 1 tab(s) orally once a day  glimepiride 2 mg oral tablet: 1 tab(s) orally once a day  Januvia 100 mg oral tablet: 1 tab(s) orally once a day  potassium chloride 20 mEq oral tablet, extended release: 1 tab(s) orally once a day  ramipril 10 mg oral tablet: 1 tab(s) orally once a day  torsemide 40 mg oral tablet: 1 tab(s) orally   atorvastatin 20 mg oral tablet: 1 tab(s) orally once a day (at bedtime)  DilTIAZem (Eqv-Cardizem CD) 240 mg/24 hours oral capsule, extended release: 1 cap(s) orally once a day  Eliquis 5 mg oral tablet: 1 tab(s) orally 2 times a day  gabapentin 100 mg oral capsule: 2 cap(s) orally once a day in AM, 1 cap midday, 3 caps at bedtime  Gemtesa 75 mg oral tablet: 1 tab(s) orally once a day  glimepiride 2 mg oral tablet: 1 tab(s) orally once a day  Januvia 100 mg oral tablet: 1 tab(s) orally once a day  pantoprazole 40 mg oral delayed release tablet: 1 tab(s) orally once a day (before a meal)  potassium chloride 20 mEq oral tablet, extended release: 1 tab(s) orally once a day  ramipril 10 mg oral tablet: 1 tab(s) orally once a day  senna leaf extract oral tablet: 2 tab(s) orally once a day (at bedtime)  torsemide 40 mg oral tablet: 1 tab(s) orally once a day

## 2025-03-31 NOTE — DIETITIAN INITIAL EVALUATION ADULT - PERTINENT LABORATORY DATA
03-31    139  |  101  |  19  ----------------------------<  152[H]  4.7   |  34[H]  |  0.53    Ca    8.9      31 Mar 2025 03:45  Phos  2.4     03-31  Mg     1.7     03-31    POCT Blood Glucose.: 170 mg/dL (03-31-25 @ 08:16)

## 2025-03-31 NOTE — DIETITIAN INITIAL EVALUATION ADULT - OTHER CALCULATIONS
03/28, 59 kg(drug dose/adm wt.)  03/30, 62.2 kg, 03/31, 61.8 kg(daily wt.), wt. gain of 2.8 kg since adm noted

## 2025-03-31 NOTE — DISCHARGE NOTE PROVIDER - NSDCCPCAREPLAN_GEN_ALL_CORE_FT
PRINCIPAL DISCHARGE DIAGNOSIS  Diagnosis: Right femoral fracture  Assessment and Plan of Treatment: Underwent surgery, continue with physical therapy   Follow up with orthopedics outpatient

## 2025-03-31 NOTE — DIETITIAN INITIAL EVALUATION ADULT - NS FNS DIET ORDER
Diet, NPO:   NPO for Procedure/Test     NPO Start Date: 30-Mar-2025,   NPO Start Time: 23:59  Except Medications (03-29-25 @ 09:02) [Active]

## 2025-03-31 NOTE — DIETITIAN INITIAL EVALUATION ADULT - ETIOLOGY
inadequate protein-energy intake in setting of history of hip replacements, reported shoulder fracture, debility, pressure ulcer

## 2025-03-31 NOTE — DISCHARGE NOTE PROVIDER - PROVIDER TOKENS
PROVIDER:[TOKEN:[[id number]],FOLLOWUP:[2 weeks]] PROVIDER:[TOKEN:[[id number]],FOLLOWUP:[2 weeks]],FREE:[LAST:[Your PCP],PHONE:[(   )    -],FAX:[(   )    -]]

## 2025-03-31 NOTE — PROGRESS NOTE ADULT - SUBJECTIVE AND OBJECTIVE BOX
Postop Check    Patient tolerated the procedure well. Patient seen and examined at bedside.  No acute complaints at this time. Pain well controlled. Denies chest pain, shortness of breath, nausea or vomiting.     PE:  Vital Signs Last 24 Hrs  T(C): 36.3 (03-31-25 @ 20:30), Max: 36.6 (03-30-25 @ 23:45)  T(F): 97.4 (03-31-25 @ 20:30), Max: 97.8 (03-30-25 @ 23:45)  HR: 63 (03-31-25 @ 20:30) (55 - 81)  BP: 98/60 (03-31-25 @ 20:30) (98/60 - 124/60)  BP(mean): --  RR: 18 (03-31-25 @ 20:30) (10 - 21)  SpO2: 98% (03-31-25 @ 20:30) (95% - 98%)    General: NAD, resting comfortably in bed  RLE:   CHERRY Dressing in place C/D/I  No calf tenderness   Motor: + EHL/FHL/TA/GSC  +SILT: SPN/DPN/Kingsley/Saph/Tib  DP/PT 2+      A/P:  89y f s/p R DFR POD 0  -PT/OT  -WBAT on the RLE  -Pain Control  -DVT ppx restart POD1 per primary team  -Continue perioperative abx x 24 hours  -FU AM Labs  -Rest, ice, compress and elevate the extremity as needed  -Incentive Spirometry  -Medical management appreciated  -Dispo pending PT eval  -Discussed above with Dr. De La O, who agrees with plan

## 2025-03-31 NOTE — DISCHARGE NOTE PROVIDER - CARE PROVIDER_API CALL
Yasir De La O  Orthopaedic Surgery  [address], [address]  [place] [address]  Phone: [phone]  Fax: [phone]  Follow Up Time: 2 weeks   Yasir De La O  Orthopaedic Surgery  [address], [address]  [place] [address]  Phone: [phone]  Fax: [phone]  Follow Up Time: 2 weeks    Your PCP,   Phone: (   )    -  Fax: (   )    -  Follow Up Time:

## 2025-03-31 NOTE — BRIEF OPERATIVE NOTE - NSICDXBRIEFPREOP_GEN_ALL_CORE_FT
PRE-OP DIAGNOSIS:  Closed fracture of proximal end of right femur 31-Mar-2025 15:05:36  Jimmy Kramer   PRE-OP DIAGNOSIS:  Fracture of distal end of right femur 31-Mar-2025 16:17:05  Javier Stevens

## 2025-03-31 NOTE — DIETITIAN INITIAL EVALUATION ADULT - PROBLEM SELECTOR PLAN 4
- C/w home ramipril  - Per [facility] note, "Pt reported not taking Torsemide for 11+ days d/t frequency of urination; Bilateral ankle/leg pitting edema/swelling ensued". She refused torsemide at [facility].  - Holding torsemide, and potassium chloride going into the surgery as patient is NPO

## 2025-03-31 NOTE — DIETITIAN INITIAL EVALUATION ADULT - PERTINENT MEDS FT
MEDICATIONS  (STANDING):  atorvastatin 20 milliGRAM(s) Oral at bedtime  chlorhexidine 2% Cloths 1 Application(s) Topical once  dextrose 50% Injectable 50 Gram(s) IV Push once  dextrose 50% Injectable 50 Gram(s) IV Push once  diltiazem    milliGRAM(s) Oral daily  gabapentin 200 milliGRAM(s) Oral <User Schedule>  gabapentin 100 milliGRAM(s) Oral <User Schedule>  gabapentin 300 milliGRAM(s) Oral <User Schedule>  glucagon  Injectable 1 milliGRAM(s) IntraMuscular once  insulin lispro (ADMELOG) corrective regimen sliding scale   SubCutaneous three times a day before meals  insulin lispro (ADMELOG) corrective regimen sliding scale   SubCutaneous at bedtime  lisinopril 40 milliGRAM(s) Oral daily  potassium phosphate / sodium phosphate Powder (PHOS-NaK) 2 Packet(s) Oral once  povidone iodine 10% Nasal Swab 1 Application(s) Both Nostrils once  torsemide 40 milliGRAM(s) Oral daily    MEDICATIONS  (PRN):  acetaminophen     Tablet .. 650 milliGRAM(s) Oral every 6 hours PRN Temp greater or equal to 38C (100.4F), Mild Pain (1 - 3)  aluminum hydroxide/magnesium hydroxide/simethicone Suspension 30 milliLiter(s) Oral every 4 hours PRN Dyspepsia  melatonin 3 milliGRAM(s) Oral at bedtime PRN Insomnia

## 2025-03-31 NOTE — DIETITIAN INITIAL EVALUATION ADULT - ORAL INTAKE PTA/DIET HISTORY
Pt is alert, oriented, hard of hearing, has hearing aid on, was able to provide diet history, c/o dry lips, requested something for her lips, RN made aware.  Pt reports depressed appetite for ~ 2 years, since shoulder fracture, c gradual wt. loss over the years.  Pt without report of chewing/swallowing difficulty/food allergies/intolerances.   Pt reports dislike to nutritional supplements.

## 2025-03-31 NOTE — DISCHARGE NOTE PROVIDER - HOSPITAL COURSE
Discharge Diagnoses:    Right distal femoral fracture, status post distal femur replacement.  Diabetes Mellitus, Type 2, uncontrolled.  Hypomagnesemia, resolved.  Chronic atrial fibrillation.  Hypertension.  Hyperlipidemia.  Hard of hearing.    History of Present Illness: This 89-year-old female with a history of diabetes mellitus type 2, chronic atrial fibrillation on Eliquis, hypertension, hyperlipidemia, and bilateral hip replacements presented from [facility] after a fall resulting in a right femoral fracture on 3/26/2025. Initial imaging revealed a comminuted distal femoral fracture with intra-articular extension. The patient underwent distal femur replacement on 3/31/2025.    Hospital Course:    Right Femoral Fracture:  The patient underwent successful distal femur replacement. Postoperatively, she is weight-bearing as tolerated (WBAT) on the right lower extremity. Pain control PRN. PT rec GIL.    Diabetes Mellitus:   hyperglycemic postoperatively, likely secondary to surgical stress. Lantus was administered, and blood glucose is being managed with sliding scale lispro. Home medications of Januvia and glimepiride, resume on dc. A1c on admission was 9.3. Follow up with endocrinology outpatient     Hypomagnesemia:   Hypomagnesemia was corrected with intravenous magnesium replacement.    Chronic Atrial Fibrillation:   Eliquis was resumed per discussion with Orthopedic Surgery. Home diltiazem was also continued.    Hypertension:   The patient was hypotensive on admission, and home blood pressure medications (ramipril and Cardizem) were temporarily held. Intravenous fluids were administered. Per [facility] records, the patient had discontinued torsemide for 11+ days prior to admission due to urinary frequency, resulting in bilateral lower extremity edema. She refused torsemide during her stay at [place]. Torsemide was restarted here, discussed importance of medication compliance.     Hyperlipidemia: Home atorvastatin was resumed.    patient seen and evaluated. DC to rehab.   DC time 37 mins

## 2025-03-31 NOTE — DIETITIAN INITIAL EVALUATION ADULT - DIET TYPE
advance diet as per post op protocol from clear liquid-> consistent carbohydrate c evening snack, low sodium

## 2025-03-31 NOTE — PROGRESS NOTE ADULT - SUBJECTIVE AND OBJECTIVE BOX
Patient seen and examined at bedside.  No acute complaints at this time. Pain well controlled. Denies chest pain, shortness of breath, nausea or vomiting. Following discussion patient and family wish to move forward with surgery today.     VITAL SIGNS:  T(C): 36.6 (03-31-25 @ 04:48), Max: 37.1 (03-30-25 @ 16:43)  HR: 81 (03-31-25 @ 04:48) (68 - 81)  BP: 111/67 (03-31-25 @ 04:48) (106/55 - 119/65)  RR: 18 (03-31-25 @ 04:48) (18 - 18)  SpO2: 96% (03-31-25 @ 04:48) (94% - 96%)    General: NAD, resting comfortably in bed  LLE: Skin intact, TTP thigh, knee brace in place  Compartments soft, compressible  Calves nontender  Motor: +GSC, TA, EHL, FHL  SILT: SPN, DPN, Tib, Saph, Kingsley  +DP    LABS:                        11.0   8.82  )-----------( 275      ( 31 Mar 2025 03:45 )             32.0     03-31    139  |  101  |  19  ----------------------------<  152[H]  4.7   |  34[H]  |  0.53    Ca    8.9      31 Mar 2025 03:45  Phos  2.4     03-31  Mg     1.7     03-31      PT/INR - ( 31 Mar 2025 03:45 )   PT: 11.2 sec;   INR: 0.99 ratio         PTT - ( 31 Mar 2025 03:45 )  PTT:28.4 sec  Urinalysis Basic - ( 31 Mar 2025 03:45 )    Color: x / Appearance: x / SG: x / pH: x  Gluc: 152 mg/dL / Ketone: x  / Bili: x / Urobili: x   Blood: x / Protein: x / Nitrite: x   Leuk Esterase: x / RBC: x / WBC x   Sq Epi: x / Non Sq Epi: x / Bacteria: x    Imaging:  XR R Femur: distal femur fx    A/P:  89y Female who presents with R distal femur fx    Plan for OR today with Dr Jaylyn  NWB RLE, knee brace  Analgesics as needed  Followup AM labs  OK for chemical DVT ppx, hold after midnight tonight for possible OR tomorrow  NPO except medications after midnight tonight, IVF while NPO  Discussed plan with Dr. De La O who agrees with above

## 2025-03-31 NOTE — DIETITIAN INITIAL EVALUATION ADULT - OTHER INFO
Pt was transferred from [facility], s/p fall 03/26,  right femoral fracture, for OR today.  The use of glimepiride, Januvia noted on outpatient medication review.  PMH include bilateral hip replacements, use of walker PTA noted.  Pt encouraged consumption of meals and snacks between meals to encourage adequate protein-energy intake when diet is resumed.

## 2025-03-31 NOTE — PROGRESS NOTE ADULT - SUBJECTIVE AND OBJECTIVE BOX
89F hard of hearing and uses hearing aid with PMHx of DM, chronic Afib on Eliquis, HTN, HLD, b/l hip replacements who was transferred from [facility] for w/ a right femoral fracture after a fall 3/26/25. She is lying in bed in NAD.      MEDICATIONS  (STANDING):  acetaminophen     Tablet .. 975 milliGRAM(s) Oral every 8 hours  acetaminophen   IVPB .. 1000 milliGRAM(s) IV Intermittent once  atorvastatin 20 milliGRAM(s) Oral at bedtime  ceFAZolin   IVPB 2000 milliGRAM(s) IV Intermittent every 8 hours  dextrose 5%. 1000 milliLiter(s) (50 mL/Hr) IV Continuous <Continuous>  dextrose 50% Injectable 25 Gram(s) IV Push once  dextrose 50% Injectable 12.5 Gram(s) IV Push once  dextrose 50% Injectable 25 Gram(s) IV Push once  diltiazem    milliGRAM(s) Oral daily  gabapentin 200 milliGRAM(s) Oral <User Schedule>  gabapentin 100 milliGRAM(s) Oral <User Schedule>  gabapentin 300 milliGRAM(s) Oral <User Schedule>  glucagon  Injectable 1 milliGRAM(s) IntraMuscular once  insulin lispro (ADMELOG) corrective regimen sliding scale   SubCutaneous three times a day before meals  insulin lispro (ADMELOG) corrective regimen sliding scale   SubCutaneous at bedtime  lisinopril 40 milliGRAM(s) Oral daily  pantoprazole    Tablet 40 milliGRAM(s) Oral before breakfast  polyethylene glycol 3350 17 Gram(s) Oral at bedtime  senna 2 Tablet(s) Oral at bedtime  sodium chloride 0.9%. 1000 milliLiter(s) (75 mL/Hr) IV Continuous <Continuous>  torsemide 40 milliGRAM(s) Oral daily    MEDICATIONS  (PRN):  dextrose Oral Gel 15 Gram(s) Oral once PRN Blood Glucose LESS THAN 70 milliGRAM(s)/deciliter  magnesium hydroxide Suspension 30 milliLiter(s) Oral daily PRN Constipation  oxyCODONE    IR 2.5 milliGRAM(s) Oral every 4 hours PRN Moderate Pain (4 - 6)  oxyCODONE    IR 5 milliGRAM(s) Oral every 4 hours PRN Severe Pain (7 - 10)  traMADol 50 milliGRAM(s) Oral every 6 hours PRN Mild Pain (1 - 3)      Allergies    sulfa drugs (Rash)    Intolerances        Vital Signs Last 24 Hrs  T(C): 36.3 (31 Mar 2025 20:30), Max: 36.6 (30 Mar 2025 23:45)  T(F): 97.4 (31 Mar 2025 20:30), Max: 97.8 (30 Mar 2025 23:45)  HR: 63 (31 Mar 2025 20:30) (55 - 81)  BP: 98/60 (31 Mar 2025 20:30) (98/60 - 124/60)  BP(mean): --  RR: 18 (31 Mar 2025 20:30) (10 - 21)  SpO2: 98% (31 Mar 2025 20:30) (95% - 98%)    Parameters below as of 31 Mar 2025 20:30  Patient On (Oxygen Delivery Method): room air        PHYSICAL EXAM:  GENERAL: NAD, well-groomed, well-developed  HEAD:  Atraumatic, Normocephalic  EYES: EOMI, PERRLA   NECK: Supple   NERVOUS SYSTEM:  Alert & Oriented X3   CHEST/LUNG: Clear to auscultation bilaterally; No rales, rhonchi, wheezing, or rubs  HEART: Regular rate and rhythm; No murmurs, rubs, or gallops  ABDOMEN: Soft, Nontender, Nondistended; Bowel sounds present  EXTREMITIES: bilateral LE edema      LABS:                        10.7   12.70 )-----------( 275      ( 31 Mar 2025 16:24 )             32.4     03-31    136  |  98  |  20  ----------------------------<  235[H]  4.6   |  30  |  0.67    Ca    8.7      31 Mar 2025 16:24  Phos  2.4     03-31  Mg     1.7     03-31      PT/INR - ( 31 Mar 2025 03:45 )   PT: 11.2 sec;   INR: 0.99 ratio         PTT - ( 31 Mar 2025 03:45 )  PTT:28.4 sec  Urinalysis Basic - ( 31 Mar 2025 16:24 )    Color: x / Appearance: x / SG: x / pH: x  Gluc: 235 mg/dL / Ketone: x  / Bili: x / Urobili: x   Blood: x / Protein: x / Nitrite: x   Leuk Esterase: x / RBC: x / WBC x   Sq Epi: x / Non Sq Epi: x / Bacteria: x       POCT Blood Glucose.: 241 mg/dL (31 Mar 2025 16:22)  POCT Blood Glucose.: 152 mg/dL (31 Mar 2025 11:21)  POCT Blood Glucose.: 170 mg/dL (31 Mar 2025 08:16)  POCT Blood Glucose.: 155 mg/dL (31 Mar 2025 04:55)      RADIOLOGY & ADDITIONAL TESTS:    03-30-25 @ 07:01  -  03-31-25 @ 07:00  --------------------------------------------------------  IN:    Oral Fluid: 440 mL  Total IN: 440 mL    OUT:    Voided (mL): 1400 mL  Total OUT: 1400 mL    Total NET: -960 mL

## 2025-04-01 LAB
A1C WITH ESTIMATED AVERAGE GLUCOSE RESULT: 9.3 % — HIGH (ref 4–5.6)
ANION GAP SERPL CALC-SCNC: 10 MMOL/L — SIGNIFICANT CHANGE UP (ref 5–17)
BUN SERPL-MCNC: 29 MG/DL — HIGH (ref 7–23)
CALCIUM SERPL-MCNC: 8.3 MG/DL — LOW (ref 8.5–10.1)
CHLORIDE SERPL-SCNC: 96 MMOL/L — SIGNIFICANT CHANGE UP (ref 96–108)
CO2 SERPL-SCNC: 28 MMOL/L — SIGNIFICANT CHANGE UP (ref 22–31)
CREAT SERPL-MCNC: 0.92 MG/DL — SIGNIFICANT CHANGE UP (ref 0.5–1.3)
EGFR: 60 ML/MIN/1.73M2 — SIGNIFICANT CHANGE UP
EGFR: 60 ML/MIN/1.73M2 — SIGNIFICANT CHANGE UP
ESTIMATED AVERAGE GLUCOSE: 220 MG/DL — HIGH (ref 68–114)
GLUCOSE BLDC GLUCOMTR-MCNC: 296 MG/DL — HIGH (ref 70–99)
GLUCOSE BLDC GLUCOMTR-MCNC: 393 MG/DL — HIGH (ref 70–99)
GLUCOSE BLDC GLUCOMTR-MCNC: 408 MG/DL — HIGH (ref 70–99)
GLUCOSE BLDC GLUCOMTR-MCNC: 414 MG/DL — HIGH (ref 70–99)
GLUCOSE BLDC GLUCOMTR-MCNC: 450 MG/DL — CRITICAL HIGH (ref 70–99)
GLUCOSE BLDC GLUCOMTR-MCNC: 495 MG/DL — CRITICAL HIGH (ref 70–99)
GLUCOSE BLDC GLUCOMTR-MCNC: 519 MG/DL — CRITICAL HIGH (ref 70–99)
GLUCOSE SERPL-MCNC: 277 MG/DL — HIGH (ref 70–99)
HCT VFR BLD CALC: 27.9 % — LOW (ref 34.5–45)
HGB BLD-MCNC: 9.1 G/DL — LOW (ref 11.5–15.5)
MAGNESIUM SERPL-MCNC: 1.5 MG/DL — LOW (ref 1.6–2.6)
MCHC RBC-ENTMCNC: 30.4 PG — SIGNIFICANT CHANGE UP (ref 27–34)
MCHC RBC-ENTMCNC: 32.6 G/DL — SIGNIFICANT CHANGE UP (ref 32–36)
MCV RBC AUTO: 93.3 FL — SIGNIFICANT CHANGE UP (ref 80–100)
NRBC BLD AUTO-RTO: 0 /100 WBCS — SIGNIFICANT CHANGE UP (ref 0–0)
PHOSPHATE SERPL-MCNC: 5 MG/DL — HIGH (ref 2.5–4.5)
PLATELET # BLD AUTO: 276 K/UL — SIGNIFICANT CHANGE UP (ref 150–400)
POTASSIUM SERPL-MCNC: 4.5 MMOL/L — SIGNIFICANT CHANGE UP (ref 3.5–5.3)
POTASSIUM SERPL-SCNC: 4.5 MMOL/L — SIGNIFICANT CHANGE UP (ref 3.5–5.3)
RBC # BLD: 2.99 M/UL — LOW (ref 3.8–5.2)
RBC # FLD: 16 % — HIGH (ref 10.3–14.5)
SODIUM SERPL-SCNC: 134 MMOL/L — LOW (ref 135–145)
WBC # BLD: 14.78 K/UL — HIGH (ref 3.8–10.5)
WBC # FLD AUTO: 14.78 K/UL — HIGH (ref 3.8–10.5)

## 2025-04-01 PROCEDURE — 99232 SBSQ HOSP IP/OBS MODERATE 35: CPT

## 2025-04-01 RX ORDER — INSULIN LISPRO 100 U/ML
INJECTION, SOLUTION INTRAVENOUS; SUBCUTANEOUS AT BEDTIME
Refills: 0 | Status: DISCONTINUED | OUTPATIENT
Start: 2025-04-01 | End: 2025-04-02

## 2025-04-01 RX ORDER — GLUCAGON 3 MG/1
1 POWDER NASAL ONCE
Refills: 0 | Status: DISCONTINUED | OUTPATIENT
Start: 2025-04-01 | End: 2025-04-02

## 2025-04-01 RX ORDER — SODIUM CHLORIDE 9 G/1000ML
1000 INJECTION, SOLUTION INTRAVENOUS
Refills: 0 | Status: DISCONTINUED | OUTPATIENT
Start: 2025-04-01 | End: 2025-04-02

## 2025-04-01 RX ORDER — DEXTROSE 50 % IN WATER 50 %
25 SYRINGE (ML) INTRAVENOUS ONCE
Refills: 0 | Status: DISCONTINUED | OUTPATIENT
Start: 2025-04-01 | End: 2025-04-02

## 2025-04-01 RX ORDER — INSULIN LISPRO 100 U/ML
INJECTION, SOLUTION INTRAVENOUS; SUBCUTANEOUS
Refills: 0 | Status: DISCONTINUED | OUTPATIENT
Start: 2025-04-01 | End: 2025-04-02

## 2025-04-01 RX ORDER — COLLAGENASE CLOSTRIDIUM HIST. 250 UNIT/G
1 OINTMENT (GRAM) TOPICAL DAILY
Refills: 0 | Status: DISCONTINUED | OUTPATIENT
Start: 2025-04-01 | End: 2025-04-03

## 2025-04-01 RX ORDER — DEXTROSE 50 % IN WATER 50 %
15 SYRINGE (ML) INTRAVENOUS ONCE
Refills: 0 | Status: DISCONTINUED | OUTPATIENT
Start: 2025-04-01 | End: 2025-04-02

## 2025-04-01 RX ORDER — DEXTROSE 50 % IN WATER 50 %
12.5 SYRINGE (ML) INTRAVENOUS ONCE
Refills: 0 | Status: DISCONTINUED | OUTPATIENT
Start: 2025-04-01 | End: 2025-04-02

## 2025-04-01 RX ORDER — INSULIN LISPRO 100 U/ML
6 INJECTION, SOLUTION INTRAVENOUS; SUBCUTANEOUS ONCE
Refills: 0 | Status: COMPLETED | OUTPATIENT
Start: 2025-04-01 | End: 2025-04-01

## 2025-04-01 RX ORDER — MAGNESIUM SULFATE 500 MG/ML
2 SYRINGE (ML) INJECTION ONCE
Refills: 0 | Status: COMPLETED | OUTPATIENT
Start: 2025-04-01 | End: 2025-04-01

## 2025-04-01 RX ORDER — INSULIN GLARGINE-YFGN 100 [IU]/ML
10 INJECTION, SOLUTION SUBCUTANEOUS ONCE
Refills: 0 | Status: COMPLETED | OUTPATIENT
Start: 2025-04-01 | End: 2025-04-01

## 2025-04-01 RX ORDER — APIXABAN 2.5 MG/1
2.5 TABLET, FILM COATED ORAL EVERY 12 HOURS
Refills: 0 | Status: DISCONTINUED | OUTPATIENT
Start: 2025-04-01 | End: 2025-04-03

## 2025-04-01 RX ADMIN — INSULIN LISPRO 8: 100 INJECTION, SOLUTION INTRAVENOUS; SUBCUTANEOUS at 21:36

## 2025-04-01 RX ADMIN — Medication 2 TABLET(S): at 21:42

## 2025-04-01 RX ADMIN — POLYETHYLENE GLYCOL 3350 17 GRAM(S): 17 POWDER, FOR SOLUTION ORAL at 21:42

## 2025-04-01 RX ADMIN — INSULIN LISPRO 6 UNIT(S): 100 INJECTION, SOLUTION INTRAVENOUS; SUBCUTANEOUS at 18:55

## 2025-04-01 RX ADMIN — Medication 975 MILLIGRAM(S): at 13:35

## 2025-04-01 RX ADMIN — ENOXAPARIN SODIUM 40 MILLIGRAM(S): 100 INJECTION SUBCUTANEOUS at 07:51

## 2025-04-01 RX ADMIN — Medication 25 GRAM(S): at 11:37

## 2025-04-01 RX ADMIN — Medication 975 MILLIGRAM(S): at 21:42

## 2025-04-01 RX ADMIN — GABAPENTIN 100 MILLIGRAM(S): 400 CAPSULE ORAL at 11:37

## 2025-04-01 RX ADMIN — Medication 975 MILLIGRAM(S): at 14:35

## 2025-04-01 RX ADMIN — INSULIN GLARGINE-YFGN 10 UNIT(S): 100 INJECTION, SOLUTION SUBCUTANEOUS at 18:54

## 2025-04-01 RX ADMIN — INSULIN LISPRO 6: 100 INJECTION, SOLUTION INTRAVENOUS; SUBCUTANEOUS at 11:37

## 2025-04-01 RX ADMIN — INSULIN LISPRO 6: 100 INJECTION, SOLUTION INTRAVENOUS; SUBCUTANEOUS at 17:11

## 2025-04-01 RX ADMIN — Medication 100 MILLIGRAM(S): at 05:21

## 2025-04-01 RX ADMIN — GABAPENTIN 200 MILLIGRAM(S): 400 CAPSULE ORAL at 05:19

## 2025-04-01 RX ADMIN — ATORVASTATIN CALCIUM 20 MILLIGRAM(S): 80 TABLET, FILM COATED ORAL at 22:24

## 2025-04-01 RX ADMIN — Medication 975 MILLIGRAM(S): at 05:19

## 2025-04-01 RX ADMIN — APIXABAN 2.5 MILLIGRAM(S): 2.5 TABLET, FILM COATED ORAL at 17:11

## 2025-04-01 RX ADMIN — INSULIN LISPRO 3: 100 INJECTION, SOLUTION INTRAVENOUS; SUBCUTANEOUS at 07:50

## 2025-04-01 RX ADMIN — Medication 40 MILLIGRAM(S): at 05:22

## 2025-04-01 RX ADMIN — Medication 75 MILLILITER(S): at 05:19

## 2025-04-01 NOTE — OCCUPATIONAL THERAPY INITIAL EVALUATION ADULT - GENERAL OBSERVATIONS, REHAB EVAL
Pt was seen for initial OT consult, encountered in bed in NAD. IV in RUE was disconnected and capped by RN Quincy. Pneumatic teds donned. Right knee dressing with ace bandage is intact. Grade 1+ edema with stiffness and decreased ROM noted in RLE. Contractures noted in right hand with arthritic changes and decreased dexterity skills. Pt has sacral DU. Pt was AA&Ox4, Huslia in left ear, cooperative & followed commands with repeated verbal cues for sequencing. Pt c/o right knee/ hip pain, soreness due to s/p distal femur fracture that warranted Replacement. This limits pt's activity tolerance ,balance, ADL management and functional mobility.

## 2025-04-01 NOTE — OCCUPATIONAL THERAPY INITIAL EVALUATION ADULT - IMPAIRMENTS CONTRIBUTING IMPAIRED BED MOBILITY, REHAB EVAL
impaired balance/cognition/decreased flexibility/impaired motor control/abnormal muscle tone/narrow base of support/pain/decreased ROM/decreased strength

## 2025-04-01 NOTE — OCCUPATIONAL THERAPY INITIAL EVALUATION ADULT - TRANSFER SAFETY CONCERNS NOTED: SIT/STAND, REHAB EVAL
losing balance/decreased proprioception/decreased sequencing ability/squat pivot/stand pivot/decreased step length/decreased weight-shifting ability

## 2025-04-01 NOTE — PHYSICAL THERAPY INITIAL EVALUATION ADULT - MODALITIES TREATMENT COMMENTS
Sacrum- StageII, Right glutes-proximal, distal pressure injuries- unstageable, Right upper posterior thigh, Left lateral lower leg- medical device related pressure injury before hospitalisation, left heel stage I pressure injury- all measurements, dressing recs in POC

## 2025-04-01 NOTE — PROGRESS NOTE ADULT - SUBJECTIVE AND OBJECTIVE BOX
Patient seen and examined at bedside.  No acute complaints at this time. Pain well controlled. Denies chest pain, shortness of breath, nausea or vomiting.     Vital Signs (24 Hrs):  T(C): 36.4 (04-01-25 @ 04:30), Max: 36.7 (04-01-25 @ 00:30)  HR: 56 (04-01-25 @ 04:30) (55 - 80)  BP: 107/66 (04-01-25 @ 04:30) (98/60 - 124/60)  RR: 18 (04-01-25 @ 04:30) (10 - 21)  SpO2: 96% (04-01-25 @ 04:30) (94% - 98%)  Wt(kg): --    LABS:                          10.7   12.70 )-----------( 275      ( 31 Mar 2025 16:24 )             32.4     03-31    136  |  98  |  20  ----------------------------<  235[H]  4.6   |  30  |  0.67    Ca    8.7      31 Mar 2025 16:24  Phos  2.4     03-31  Mg     1.7     03-31      PT/INR - ( 31 Mar 2025 03:45 )   PT: 11.2 sec;   INR: 0.99 ratio         PTT - ( 31 Mar 2025 03:45 )  PTT:28.4 sec    PE:  General: NAD, resting comfortably in bed  RLE:   CHERRY Dressing in place C/D/I  No calf tenderness   Motor: + EHL/FHL/TA/GSC  +SILT: SPN/DPN/Kingsley/Saph/Tib  DP/PT 2+      A/P:  89y f s/p R DFR POD 1  -PT/OT  -WBAT on the RLE  -Pain Control  -DVT ppx restart POD1 per primary team  -Continue perioperative abx x 24 hours  -FU AM Labs  -Rest, ice, compress and elevate the extremity as needed  -Incentive Spirometry  -Medical management appreciated  -Dispo pending PT eval  -Discussed above with Dr. De La O, who agrees with plan

## 2025-04-01 NOTE — PHYSICAL THERAPY INITIAL EVALUATION ADULT - GAIT TRAINING, PT EVAL
Pt will be able to ambulate 350 feet using [RW] and maintaining WB precautions  independently in 4 weeks to improve functional mobility

## 2025-04-01 NOTE — PHYSICAL THERAPY INITIAL EVALUATION ADULT - ONSET DATE, REHAB EVAL
"  Assessment & Plan     Varicose veins of both lower extremities with pain  Painful varicose veins.  Chronic skin thinning with intermittent bruising.    Plan:   - Vascular Medicine Referral; Future    Digital mucous cyst  Exam findings were discussed and assurance given   May follow up and referred to orthopedics upon request       The longitudinal plan of care for the diagnosis(es)/condition(s) as documented were addressed during this visit. Due to the added complexity in care, I will continue to support Felicity in the subsequent management and with ongoing continuity of care.    Subjective   Felicity is a 82 year old, Presenting for:    Small \"bubbles\" in both hands, not painful. The one on the right index finger has hardened    Painful and sensitive bruises on both legs. Patient denies taking blood thinners or aspirin.  Pain in the anterior lower legs.            1/16/2025     8:43 AM   Additional Questions   Roomed by Javid Troy   Accompanied by N/A     History of Present Illness       Reason for visit:  Blister like areas on my hands  and old bruise on leg very sensitive/painful.   She is taking medications regularly.                     Objective    Pulse 78   Resp 16   Ht 1.632 m (5' 4.25\")   Wt 62 kg (136 lb 11.2 oz)   LMP  (LMP Unknown)   SpO2 99%   BMI 23.28 kg/m    Body mass index is 23.28 kg/m .  Physical Exam   Painful varicose veins.  Chronic skin thinning with intermittent bruising.  Digital mucose cysts            Signed Electronically by: Watson Bone MD    "
31-Mar-2025

## 2025-04-01 NOTE — OCCUPATIONAL THERAPY INITIAL EVALUATION ADULT - IMPAIRED TRANSFERS: SIT/STAND, REHAB EVAL
impaired balance/cognition/impaired coordination/decreased flexibility/impaired motor control/abnormal muscle tone/narrow base of support/pain/impaired postural control/decreased ROM/scissoring/decreased strength

## 2025-04-01 NOTE — OCCUPATIONAL THERAPY INITIAL EVALUATION ADULT - RANGE OF MOTION EXAMINATION, LOWER EXTREMITY
ROM is limited in right hip, knee and ankle  due to pain/Left LE Active ROM was WFL (within functional limits)/Left LE Passive ROM was WFL (w/i functional limits)

## 2025-04-01 NOTE — PHYSICAL THERAPY INITIAL EVALUATION ADULT - RANGE OF MOTION, PT EVAL
Pt will be able to improve ROM of right knee in all planes  to WFL  to improve independent in Gait in  6 to 8 weeks

## 2025-04-01 NOTE — OCCUPATIONAL THERAPY INITIAL EVALUATION ADULT - ADDITIONAL COMMENTS
Prior to admission, pt was functioning in her roles, self sufficient & ambulating independently inside with a rollator and outside with a rolling walker. Pt uses compensatory techniques with right hand to grasp rolling walker. Presently, pt needs assistance with functional mobility, self care tasks due to pain, weakness, stiffness and decreased ROM from replacement of right distal femur. Pt is right hand dominant and wears glasses for reading. Pt has been relying on left hand due to multiple contractures in RUE. Right index and thump are functional.  Upon evaluation ,  Pt performed bed mobility with mod assistx2 and use of leg  to advance RLE. Pt sat and dangled at the edge of bed with fair balance. After height of bed was raised, pt transferred sit to stand with mod assist x2 rolling walker / WBAT and took 4-5 lateral steps toward HOB. Pt needed manual assist with right hand placement on rolling walker due to contracture. Pt's gait was very unsteady and balance poor. Prior to admission, pt was functioning in her roles, self sufficient & ambulating independently inside with a rollator and outside with a rolling walker. Pt uses compensatory techniques with right hand to grasp rolling walker. Presently, pt needs assistance with functional mobility, self care tasks due to pain, weakness, stiffness and decreased ROM from replacement of right distal femur. Pt is right hand dominant and wears glasses for reading. Pt has been relying on left hand due to multiple contractures in RUE. Right indexR  thump are functional. Upon evaluation, Pt performed bed mobility with mod assistx2 and use of leg  to advance RLE. Pt sat and dangled at the edge of bed with fair balance. After height of bed was raised, pt transferred sit to stand with mod assist x2 rolling walker / WBAT and took 4-5 lateral steps toward HOB with difficulty. Pt needed manual assist with right hand placement on rolling walker due to contracture. Pt's gait was very unsteady and balance poor.

## 2025-04-01 NOTE — PROGRESS NOTE ADULT - SUBJECTIVE AND OBJECTIVE BOX
89F hard of hearing and uses hearing aid with PMHx of DM, chronic Afib on Eliquis, HTN, HLD, b/l hip replacements who was transferred from [facility] for w/ a right femoral fracture after a fall 3/26/25. She is lying in bed in NAD.     MEDICATIONS  (STANDING):  acetaminophen     Tablet .. 975 milliGRAM(s) Oral every 8 hours  acetaminophen   IVPB .. 1000 milliGRAM(s) IV Intermittent once  apixaban 2.5 milliGRAM(s) Oral every 12 hours  atorvastatin 20 milliGRAM(s) Oral at bedtime  collagenase Ointment 1 Application(s) Topical daily  dextrose 5%. 1000 milliLiter(s) (100 mL/Hr) IV Continuous <Continuous>  dextrose 5%. 1000 milliLiter(s) (50 mL/Hr) IV Continuous <Continuous>  dextrose 50% Injectable 12.5 Gram(s) IV Push once  dextrose 50% Injectable 25 Gram(s) IV Push once  dextrose 50% Injectable 25 Gram(s) IV Push once  diltiazem    milliGRAM(s) Oral daily  gabapentin 200 milliGRAM(s) Oral <User Schedule>  gabapentin 100 milliGRAM(s) Oral <User Schedule>  gabapentin 300 milliGRAM(s) Oral <User Schedule>  glucagon  Injectable 1 milliGRAM(s) IntraMuscular once  insulin glargine Injectable (LANTUS) 10 Unit(s) SubCutaneous once  insulin lispro (ADMELOG) corrective regimen sliding scale   SubCutaneous three times a day before meals  insulin lispro (ADMELOG) corrective regimen sliding scale   SubCutaneous at bedtime  insulin lispro Injectable (ADMELOG). 6 Unit(s) SubCutaneous once  lisinopril 40 milliGRAM(s) Oral daily  pantoprazole    Tablet 40 milliGRAM(s) Oral before breakfast  polyethylene glycol 3350 17 Gram(s) Oral at bedtime  senna 2 Tablet(s) Oral at bedtime  sodium chloride 0.9%. 1000 milliLiter(s) (75 mL/Hr) IV Continuous <Continuous>  torsemide 40 milliGRAM(s) Oral daily    MEDICATIONS  (PRN):  dextrose Oral Gel 15 Gram(s) Oral once PRN Blood Glucose LESS THAN 70 milliGRAM(s)/deciliter  magnesium hydroxide Suspension 30 milliLiter(s) Oral daily PRN Constipation  oxyCODONE    IR 2.5 milliGRAM(s) Oral every 4 hours PRN Moderate Pain (4 - 6)  oxyCODONE    IR 5 milliGRAM(s) Oral every 4 hours PRN Severe Pain (7 - 10)  traMADol 50 milliGRAM(s) Oral every 6 hours PRN Mild Pain (1 - 3)      Allergies    sulfa drugs (Rash)    Intolerances        Vital Signs Last 24 Hrs  T(C): 36.8 (01 Apr 2025 16:55), Max: 36.8 (01 Apr 2025 16:55)  T(F): 98.2 (01 Apr 2025 16:55), Max: 98.2 (01 Apr 2025 16:55)  HR: 63 (01 Apr 2025 16:55) (55 - 87)  BP: 99/66 (01 Apr 2025 16:55) (98/60 - 108/68)  BP(mean): 81 (01 Apr 2025 12:35) (81 - 81)  RR: 18 (01 Apr 2025 16:55) (16 - 18)  SpO2: 94% (01 Apr 2025 16:55) (93% - 98%)    Parameters below as of 01 Apr 2025 12:35  Patient On (Oxygen Delivery Method): room air    PHYSICAL EXAM:  GENERAL: NAD, well-groomed, well-developed  HEAD:  Atraumatic, Normocephalic  EYES: EOMI, PERRLA   NECK: Supple   NERVOUS SYSTEM:  Alert & Oriented X3   CHEST/LUNG: Clear to auscultation bilaterally; No rales, rhonchi, wheezing, or rubs  HEART: Regular rate and rhythm; No murmurs, rubs, or gallops  ABDOMEN: Soft, Nontender, Nondistended; Bowel sounds present  EXTREMITIES: bilateral LE edema    LABS:                        9.1    14.78 )-----------( 276      ( 01 Apr 2025 06:37 )             27.9     04-01    134[L]  |  96  |  29[H]  ----------------------------<  277[H]  4.5   |  28  |  0.92    Ca    8.3[L]      01 Apr 2025 06:37  Phos  5.0     04-01  Mg     1.5     04-01      PT/INR - ( 31 Mar 2025 03:45 )   PT: 11.2 sec;   INR: 0.99 ratio         PTT - ( 31 Mar 2025 03:45 )  PTT:28.4 sec  Urinalysis Basic - ( 01 Apr 2025 06:37 )    Color: x / Appearance: x / SG: x / pH: x  Gluc: 277 mg/dL / Ketone: x  / Bili: x / Urobili: x   Blood: x / Protein: x / Nitrite: x   Leuk Esterase: x / RBC: x / WBC x   Sq Epi: x / Non Sq Epi: x / Bacteria: x       POCT Blood Glucose.: 519 mg/dL (01 Apr 2025 16:39)  POCT Blood Glucose.: 495 mg/dL (01 Apr 2025 16:36)  POCT Blood Glucose.: 450 mg/dL (01 Apr 2025 11:17)  POCT Blood Glucose.: 408 mg/dL (01 Apr 2025 11:15)  POCT Blood Glucose.: 296 mg/dL (01 Apr 2025 07:34)  POCT Blood Glucose.: 326 mg/dL (31 Mar 2025 21:34)      RADIOLOGY & ADDITIONAL TESTS:    03-31-25 @ 07:01  -  04-01-25 @ 07:00  --------------------------------------------------------  IN:    IV PiggyBack: 100 mL    Oral Fluid: 450 mL    sodium chloride 0.9%: 900 mL  Total IN: 1450 mL    OUT:  Total OUT: 0 mL    Total NET: 1450 mL

## 2025-04-01 NOTE — PHYSICAL THERAPY INITIAL EVALUATION ADULT - STRENGTHENING, PT EVAL
Pt will improve muscle strength in all extremities to WFL in 6 to 8  weeks to perform Gait independently

## 2025-04-01 NOTE — PHYSICAL THERAPY INITIAL EVALUATION ADULT - TRANSFER TRAINING, PT EVAL
Pt will be able to perform sit to stand, stand pivot transfer using [RW] and maintaining WB precautions  independently in 4 weeks to improve functional transfers between any 2 surfaces

## 2025-04-01 NOTE — OCCUPATIONAL THERAPY INITIAL EVALUATION ADULT - PERTINENT HX OF CURRENT PROBLEM, REHAB EVAL
Pt  is an 89 y/o female who presented to ER  due to mechanical fall . Pt is diagnosed with right distal femur fracture as confirmed by imaging. As per chart" Patient was using her walker in the bedroom and tripped in her slipper and fell. Since then, she has R leg and knee pain. She is tired due to lack of sleep" . Pt has PMH DM, chronic Afib on Eliquis, HTN, HLD, B/L hip replacements and s/p fall in 24 with fractured left hip.  Pt underwent s/p Replacement, femur, distal 31-Mar-2025 and is allowed WBAT.

## 2025-04-01 NOTE — PHYSICAL THERAPY INITIAL EVALUATION ADULT - ADDITIONAL COMMENTS
As per daughter at bed side yesterday to PT: pt resides in ahouse with spouse with garage entry 2 wide steps that fits with RW, then 1 more step from living room to kitchen that also fits with RW, all are at one level, has walk in shower with 1 littele step to get in the shower for which pt needs assist all the time from the spouse, has grab bars in both shower, toilet, has raised toilet seat with arms, uses rollator indoors, RW outdoors, no HHA, daughter agreed for [facility] DC recs for this  hospitalization

## 2025-04-01 NOTE — OCCUPATIONAL THERAPY INITIAL EVALUATION ADULT - BALANCE TRAINING, PT EVAL
Pt will increased standing balance from poor to fair in 8 weeks to prevent falls, optimize pt's ability for ADL management & safely navigate in all terrains

## 2025-04-01 NOTE — OCCUPATIONAL THERAPY INITIAL EVALUATION ADULT - NSOTDISCHREC_GEN_A_CORE
Sub-acute Rehab to facilitate improvements in BADL, IADL  balance, strength and functional transfers so as to ensure safe return to prior living environment and community re-entry with decreased risk of falls.

## 2025-04-01 NOTE — PHYSICAL THERAPY INITIAL EVALUATION ADULT - BALANCE TRAINING, PT EVAL
Pt will improve static & dynamic standing balance to Good using [Rolling walker] maintaining WB precaution  to perform  Gait independently in 4 weeks

## 2025-04-01 NOTE — OCCUPATIONAL THERAPY INITIAL EVALUATION ADULT - LIVES WITH, PROFILE
in a private house with 2 wide steps to enter from the garage and another wide step to the kitchen, without handrail , but can fit a rolling walker. Pt has a small step to access her bathroom that has walk in shower , fixed/ retractable  shower head, surrounded by grab bars and a stand toilet with raised toilet seat / safety arms.. All living amenities are located on one level./spouse

## 2025-04-01 NOTE — OCCUPATIONAL THERAPY INITIAL EVALUATION ADULT - PRECAUTIONS/LIMITATIONS, REHAB EVAL
Pt should be turned & positioned every 2 hours to prevent skin breakdown due to reduced  mobility/fall precautions/hearing precautions

## 2025-04-01 NOTE — PHYSICAL THERAPY INITIAL EVALUATION ADULT - IMPAIRMENTS FOUND, PT EVAL
aerobic capacity/endurance/gait, locomotion, and balance/joint integrity and mobility/muscle strength/poor safety awareness/posture/ROM

## 2025-04-01 NOTE — OCCUPATIONAL THERAPY INITIAL EVALUATION ADULT - RANGE OF MOTION EXAMINATION, UPPER EXTREMITY
Pt is unable to make a composite fist due to contractures with arthritic changes./Left UE Active ROM was WFL (within functional limits)/Left UE Passive ROM was WFL  (within functional limits)/Right UE Passive ROM was WFL  (within functional limits)/Right UE Active Assistive ROM was WFL  (within functional limits)

## 2025-04-02 LAB
ALBUMIN SERPL ELPH-MCNC: 1.6 G/DL — LOW (ref 3.3–5)
ALP SERPL-CCNC: 95 U/L — SIGNIFICANT CHANGE UP (ref 40–120)
ALT FLD-CCNC: 6 U/L — LOW (ref 12–78)
ANION GAP SERPL CALC-SCNC: 4 MMOL/L — LOW (ref 5–17)
AST SERPL-CCNC: 15 U/L — SIGNIFICANT CHANGE UP (ref 15–37)
BILIRUB SERPL-MCNC: 0.5 MG/DL — SIGNIFICANT CHANGE UP (ref 0.2–1.2)
BUN SERPL-MCNC: 35 MG/DL — HIGH (ref 7–23)
CALCIUM SERPL-MCNC: 8.7 MG/DL — SIGNIFICANT CHANGE UP (ref 8.5–10.1)
CHLORIDE SERPL-SCNC: 95 MMOL/L — LOW (ref 96–108)
CO2 SERPL-SCNC: 31 MMOL/L — SIGNIFICANT CHANGE UP (ref 22–31)
CREAT SERPL-MCNC: 0.86 MG/DL — SIGNIFICANT CHANGE UP (ref 0.5–1.3)
EGFR: 65 ML/MIN/1.73M2 — SIGNIFICANT CHANGE UP
EGFR: 65 ML/MIN/1.73M2 — SIGNIFICANT CHANGE UP
GLUCOSE BLDC GLUCOMTR-MCNC: 184 MG/DL — HIGH (ref 70–99)
GLUCOSE BLDC GLUCOMTR-MCNC: 252 MG/DL — HIGH (ref 70–99)
GLUCOSE BLDC GLUCOMTR-MCNC: 318 MG/DL — HIGH (ref 70–99)
GLUCOSE BLDC GLUCOMTR-MCNC: 398 MG/DL — HIGH (ref 70–99)
GLUCOSE BLDC GLUCOMTR-MCNC: 422 MG/DL — HIGH (ref 70–99)
GLUCOSE BLDC GLUCOMTR-MCNC: 439 MG/DL — HIGH (ref 70–99)
GLUCOSE SERPL-MCNC: 176 MG/DL — HIGH (ref 70–99)
HCT VFR BLD CALC: 29.3 % — LOW (ref 34.5–45)
HGB BLD-MCNC: 9.7 G/DL — LOW (ref 11.5–15.5)
MAGNESIUM SERPL-MCNC: 1.9 MG/DL — SIGNIFICANT CHANGE UP (ref 1.6–2.6)
MCHC RBC-ENTMCNC: 30 PG — SIGNIFICANT CHANGE UP (ref 27–34)
MCHC RBC-ENTMCNC: 33.1 G/DL — SIGNIFICANT CHANGE UP (ref 32–36)
MCV RBC AUTO: 90.7 FL — SIGNIFICANT CHANGE UP (ref 80–100)
NRBC BLD AUTO-RTO: 0 /100 WBCS — SIGNIFICANT CHANGE UP (ref 0–0)
PHOSPHATE SERPL-MCNC: 2.3 MG/DL — LOW (ref 2.5–4.5)
PLATELET # BLD AUTO: 286 K/UL — SIGNIFICANT CHANGE UP (ref 150–400)
POTASSIUM SERPL-MCNC: 4.9 MMOL/L — SIGNIFICANT CHANGE UP (ref 3.5–5.3)
POTASSIUM SERPL-SCNC: 4.9 MMOL/L — SIGNIFICANT CHANGE UP (ref 3.5–5.3)
PROT SERPL-MCNC: 5.3 GM/DL — LOW (ref 6–8.3)
RBC # BLD: 3.23 M/UL — LOW (ref 3.8–5.2)
RBC # FLD: 15.9 % — HIGH (ref 10.3–14.5)
SODIUM SERPL-SCNC: 130 MMOL/L — LOW (ref 135–145)
WBC # BLD: 15.73 K/UL — HIGH (ref 3.8–10.5)
WBC # FLD AUTO: 15.73 K/UL — HIGH (ref 3.8–10.5)

## 2025-04-02 PROCEDURE — 99232 SBSQ HOSP IP/OBS MODERATE 35: CPT

## 2025-04-02 RX ORDER — SODIUM CHLORIDE 9 G/1000ML
1000 INJECTION, SOLUTION INTRAVENOUS
Refills: 0 | Status: DISCONTINUED | OUTPATIENT
Start: 2025-04-02 | End: 2025-04-03

## 2025-04-02 RX ORDER — SODIUM CHLORIDE 9 G/1000ML
500 INJECTION, SOLUTION INTRAVENOUS ONCE
Refills: 0 | Status: COMPLETED | OUTPATIENT
Start: 2025-04-02 | End: 2025-04-02

## 2025-04-02 RX ORDER — INSULIN GLARGINE-YFGN 100 [IU]/ML
10 INJECTION, SOLUTION SUBCUTANEOUS AT BEDTIME
Refills: 0 | Status: DISCONTINUED | OUTPATIENT
Start: 2025-04-02 | End: 2025-04-03

## 2025-04-02 RX ORDER — DEXTROSE 50 % IN WATER 50 %
25 SYRINGE (ML) INTRAVENOUS ONCE
Refills: 0 | Status: DISCONTINUED | OUTPATIENT
Start: 2025-04-02 | End: 2025-04-03

## 2025-04-02 RX ORDER — INSULIN LISPRO 100 U/ML
INJECTION, SOLUTION INTRAVENOUS; SUBCUTANEOUS AT BEDTIME
Refills: 0 | Status: DISCONTINUED | OUTPATIENT
Start: 2025-04-02 | End: 2025-04-03

## 2025-04-02 RX ORDER — DEXTROSE 50 % IN WATER 50 %
15 SYRINGE (ML) INTRAVENOUS ONCE
Refills: 0 | Status: DISCONTINUED | OUTPATIENT
Start: 2025-04-02 | End: 2025-04-03

## 2025-04-02 RX ORDER — INSULIN LISPRO 100 U/ML
INJECTION, SOLUTION INTRAVENOUS; SUBCUTANEOUS
Refills: 0 | Status: DISCONTINUED | OUTPATIENT
Start: 2025-04-02 | End: 2025-04-03

## 2025-04-02 RX ORDER — DEXTROSE 50 % IN WATER 50 %
12.5 SYRINGE (ML) INTRAVENOUS ONCE
Refills: 0 | Status: DISCONTINUED | OUTPATIENT
Start: 2025-04-02 | End: 2025-04-03

## 2025-04-02 RX ORDER — GLUCAGON 3 MG/1
1 POWDER NASAL ONCE
Refills: 0 | Status: DISCONTINUED | OUTPATIENT
Start: 2025-04-02 | End: 2025-04-03

## 2025-04-02 RX ADMIN — Medication 975 MILLIGRAM(S): at 14:13

## 2025-04-02 RX ADMIN — Medication 1 APPLICATION(S): at 14:13

## 2025-04-02 RX ADMIN — Medication 975 MILLIGRAM(S): at 05:12

## 2025-04-02 RX ADMIN — INSULIN LISPRO 4: 100 INJECTION, SOLUTION INTRAVENOUS; SUBCUTANEOUS at 22:32

## 2025-04-02 RX ADMIN — INSULIN LISPRO 2: 100 INJECTION, SOLUTION INTRAVENOUS; SUBCUTANEOUS at 08:12

## 2025-04-02 RX ADMIN — APIXABAN 2.5 MILLIGRAM(S): 2.5 TABLET, FILM COATED ORAL at 05:12

## 2025-04-02 RX ADMIN — INSULIN LISPRO 12: 100 INJECTION, SOLUTION INTRAVENOUS; SUBCUTANEOUS at 11:47

## 2025-04-02 RX ADMIN — DILTIAZEM HYDROCHLORIDE 240 MILLIGRAM(S): 240 TABLET, EXTENDED RELEASE ORAL at 05:14

## 2025-04-02 RX ADMIN — Medication 40 MILLIGRAM(S): at 05:17

## 2025-04-02 RX ADMIN — ATORVASTATIN CALCIUM 20 MILLIGRAM(S): 80 TABLET, FILM COATED ORAL at 22:55

## 2025-04-02 RX ADMIN — GABAPENTIN 300 MILLIGRAM(S): 400 CAPSULE ORAL at 22:57

## 2025-04-02 RX ADMIN — GABAPENTIN 100 MILLIGRAM(S): 400 CAPSULE ORAL at 11:46

## 2025-04-02 RX ADMIN — Medication 975 MILLIGRAM(S): at 23:24

## 2025-04-02 RX ADMIN — Medication 975 MILLIGRAM(S): at 22:54

## 2025-04-02 RX ADMIN — POLYETHYLENE GLYCOL 3350 17 GRAM(S): 17 POWDER, FOR SOLUTION ORAL at 22:57

## 2025-04-02 RX ADMIN — INSULIN LISPRO 10: 100 INJECTION, SOLUTION INTRAVENOUS; SUBCUTANEOUS at 16:49

## 2025-04-02 RX ADMIN — LISINOPRIL 40 MILLIGRAM(S): 5 TABLET ORAL at 05:14

## 2025-04-02 RX ADMIN — INSULIN GLARGINE-YFGN 10 UNIT(S): 100 INJECTION, SOLUTION SUBCUTANEOUS at 22:32

## 2025-04-02 RX ADMIN — GABAPENTIN 200 MILLIGRAM(S): 400 CAPSULE ORAL at 05:18

## 2025-04-02 RX ADMIN — SODIUM CHLORIDE 250 MILLILITER(S): 9 INJECTION, SOLUTION INTRAVENOUS at 12:09

## 2025-04-02 RX ADMIN — Medication 2 TABLET(S): at 22:55

## 2025-04-02 NOTE — PROGRESS NOTE ADULT - SUBJECTIVE AND OBJECTIVE BOX
Patient seen and examined at bedside.  No acute complaints at this time. Pain well controlled. Denies chest pain, shortness of breath, nausea or vomiting.       LABS:                        9.1    14.78 )-----------( 276      ( 01 Apr 2025 06:37 )             27.9     04-01    134[L]  |  96  |  29[H]  ----------------------------<  277[H]  4.5   |  28  |  0.92    Ca    8.3[L]      01 Apr 2025 06:37  Phos  5.0     04-01  Mg     1.5     04-01        Urinalysis Basic - ( 01 Apr 2025 06:37 )    Color: x / Appearance: x / SG: x / pH: x  Gluc: 277 mg/dL / Ketone: x  / Bili: x / Urobili: x   Blood: x / Protein: x / Nitrite: x   Leuk Esterase: x / RBC: x / WBC x   Sq Epi: x / Non Sq Epi: x / Bacteria: x        VITAL SIGNS:  T(C): 36.3 (04-01-25 @ 23:15), Max: 36.8 (04-01-25 @ 16:55)  HR: 64 (04-01-25 @ 23:15) (56 - 87)  BP: 107/60 (04-01-25 @ 23:15) (99/66 - 108/68)  RR: 17 (04-01-25 @ 23:15) (16 - 18)  SpO2: 94% (04-01-25 @ 23:15) (93% - 96%)      PE:  General: NAD, resting comfortably in bed  RLE:   CHERRY Dressing in place C/D/I  No calf tenderness   compartments soft  Motor: + EHL/FHL/TA/GSC  +SILT: SPN/DPN/Kingsley/Saph/Tib  DP/PT palpable      A/P:  89y f s/p R DFR POD 2  -PT/OT  -WBAT on the RLE  -Pain Control  -DVT ppx: eliquis  -FU AM Labs  -Rest, ice, compress and elevate the extremity as needed  -Incentive Spirometry  -Medical comanagement appreciated  -Dispo: [facility], ortho stable for DC planning  -will discuss with Dr. De La O and advise with changes to plan

## 2025-04-02 NOTE — PROGRESS NOTE ADULT - SUBJECTIVE AND OBJECTIVE BOX
Patient is a 89y old  Female who presents with a chief complaint of Femur fracture (02 Apr 2025 04:53)      INTERVAL HPI/OVERNIGHT EVENTS: Overnight no acute events. Hypotesnive this AM.     MEDICATIONS  (STANDING):  acetaminophen     Tablet .. 975 milliGRAM(s) Oral every 8 hours  acetaminophen   IVPB .. 1000 milliGRAM(s) IV Intermittent once  apixaban 2.5 milliGRAM(s) Oral every 12 hours  atorvastatin 20 milliGRAM(s) Oral at bedtime  collagenase Ointment 1 Application(s) Topical daily  dextrose 5%. 1000 milliLiter(s) (50 mL/Hr) IV Continuous <Continuous>  dextrose 5%. 1000 milliLiter(s) (100 mL/Hr) IV Continuous <Continuous>  dextrose 50% Injectable 25 Gram(s) IV Push once  dextrose 50% Injectable 12.5 Gram(s) IV Push once  dextrose 50% Injectable 25 Gram(s) IV Push once  diltiazem    milliGRAM(s) Oral daily  gabapentin 200 milliGRAM(s) Oral <User Schedule>  gabapentin 100 milliGRAM(s) Oral <User Schedule>  gabapentin 300 milliGRAM(s) Oral <User Schedule>  glucagon  Injectable 1 milliGRAM(s) IntraMuscular once  insulin glargine Injectable (LANTUS) 10 Unit(s) SubCutaneous at bedtime  insulin lispro (ADMELOG) corrective regimen sliding scale   SubCutaneous three times a day before meals  insulin lispro (ADMELOG) corrective regimen sliding scale   SubCutaneous at bedtime  lactated ringers. 1000 milliLiter(s) (100 mL/Hr) IV Continuous <Continuous>  lisinopril 40 milliGRAM(s) Oral daily  pantoprazole    Tablet 40 milliGRAM(s) Oral before breakfast  polyethylene glycol 3350 17 Gram(s) Oral at bedtime  senna 2 Tablet(s) Oral at bedtime  torsemide 40 milliGRAM(s) Oral daily    MEDICATIONS  (PRN):  dextrose Oral Gel 15 Gram(s) Oral once PRN Blood Glucose LESS THAN 70 milliGRAM(s)/deciliter  magnesium hydroxide Suspension 30 milliLiter(s) Oral daily PRN Constipation  oxyCODONE    IR 2.5 milliGRAM(s) Oral every 4 hours PRN Moderate Pain (4 - 6)  oxyCODONE    IR 5 milliGRAM(s) Oral every 4 hours PRN Severe Pain (7 - 10)  traMADol 50 milliGRAM(s) Oral every 6 hours PRN Mild Pain (1 - 3)      Allergies    sulfa drugs (Rash)    Intolerances        REVIEW OF SYSTEMS:  ROS negative unless stated otherwise.    Vital Signs Last 24 Hrs  T(C): 36.9 (02 Apr 2025 16:56), Max: 36.9 (02 Apr 2025 16:56)  T(F): 98.5 (02 Apr 2025 16:56), Max: 98.5 (02 Apr 2025 16:56)  HR: 98 (02 Apr 2025 16:56) (60 - 98)  BP: 108/67 (02 Apr 2025 16:56) (88/45 - 126/61)  BP(mean): --  RR: 18 (02 Apr 2025 16:56) (17 - 18)  SpO2: 96% (02 Apr 2025 16:56) (93% - 97%)    Parameters below as of 02 Apr 2025 14:00  Patient On (Oxygen Delivery Method): room air        PHYSICAL EXAM:  GENERAL: NAD, well-groomed, well-developed  HEAD:  Atraumatic, Normocephalic  EYES: EOMI, PERRLA   NECK: Supple   NERVOUS SYSTEM:  Alert & Oriented X3   CHEST/LUNG: Clear to auscultation bilaterally; No rales, rhonchi, wheezing, or rubs  HEART: Regular rate and rhythm; No murmurs, rubs, or gallops  ABDOMEN: Soft, Nontender, Nondistended; Bowel sounds presen    LABS:                        9.7    15.73 )-----------( 286      ( 02 Apr 2025 08:20 )             29.3     04-02    130[L]  |  95[L]  |  35[H]  ----------------------------<  176[H]  4.9   |  31  |  0.86    Ca    8.7      02 Apr 2025 08:20  Phos  2.3     04-02  Mg     1.9     04-02    TPro  5.3[L]  /  Alb  1.6[L]  /  TBili  0.5  /  DBili  x   /  AST  15  /  ALT  6[L]  /  AlkPhos  95  04-02      Urinalysis Basic - ( 02 Apr 2025 08:20 )    Color: x / Appearance: x / SG: x / pH: x  Gluc: 176 mg/dL / Ketone: x  / Bili: x / Urobili: x   Blood: x / Protein: x / Nitrite: x   Leuk Esterase: x / RBC: x / WBC x   Sq Epi: x / Non Sq Epi: x / Bacteria: x      CAPILLARY BLOOD GLUCOSE      POCT Blood Glucose.: 398 mg/dL (02 Apr 2025 16:09)  POCT Blood Glucose.: 439 mg/dL (02 Apr 2025 11:38)  POCT Blood Glucose.: 422 mg/dL (02 Apr 2025 11:35)  POCT Blood Glucose.: 184 mg/dL (02 Apr 2025 07:26)  POCT Blood Glucose.: 252 mg/dL (02 Apr 2025 00:07)  POCT Blood Glucose.: 414 mg/dL (01 Apr 2025 20:51)  POCT Blood Glucose.: 393 mg/dL (01 Apr 2025 20:50)      RADIOLOGY & ADDITIONAL TESTS:    Imaging Personally Reviewed:  [ X] YES  [ ] NO    Consultant(s) Notes Reviewed:  [ X] YES  [ ] NO    Care Discussed with Consultants/Other Providers [X ] YES  [ ] NO

## 2025-04-02 NOTE — PROGRESS NOTE ADULT - ASSESSMENT
89F hard of hearing and uses hearing aid with PMHx of DM, chronic Afib on Eliquis, HTN, HLD, b/l hip replacements who was transferred from [facility] for w/ a right femoral fracture after a fall 3/26/25.     Right femoral fracture  - CT R femur 3/26/25 = Extensively comminuted fracture of the distal femur with intra-articular extension through the trochlear sulcus. Moderate knee lipohemarthrosis. Moderate soft tissue and muscular edema surrounding the fracture site. Moderate iliopsoas bursitis.  - Xray pelvis/R femur/R knee 3/26/25 = Mildly displaced, oblique distal right femur fracture. Bilateral total hip arthroplasties without hardware fracture or periprosthetic loosening.  - CXR 3/26/25 = No focal consolidation  - c/w Pain control w/ gabapentin   - Please refer to [facility] cardiac consultation note on 3/27/25 for preop evaluation on [facility].  - Orthopedics on board, but patient is refusing surgery at this time and will think about it over the weekend   - NWB RLE, strict bed rest at this time  - BJKI for comfort while in bed    Chronic atrial fibrillation  - hold home Eliquis and will give therapeutic Lovenox   - c/w home diltiazem      HTN  - c/w home ramipril & cardizem  - Per [place] note, "Pt reported not taking Torsemide for 11+ days d/t frequency of urination; Bilateral ankle/leg pitting edema/swelling ensued". She refused torsemide at [facility].  - resume torsemide     HLD   - c/w home atorvastatin    DM (diabetes mellitus).   - Home med januvia, glimepiride  - A1c 9.8 at [facility]  - c/w ISS + FS q6hrs    Prophylaxis:  DVT: Lovenox   GI: PO diet   
89F hard of hearing and uses hearing aid with PMHx of DM, chronic Afib on Eliquis, HTN, HLD, b/l hip replacements who was transferred from [facility] for w/ a right femoral fracture after a fall 3/26/25.     Right femoral fracture  - CT R femur 3/26/25 = Extensively comminuted fracture of the distal femur with intra-articular extension through the trochlear sulcus. Moderate knee lipohemarthrosis. Moderate soft tissue and muscular edema surrounding the fracture site. Moderate iliopsoas bursitis.  - Xray pelvis/R femur/R knee 3/26/25 = Mildly displaced, oblique distal right femur fracture. Bilateral total hip arthroplasties without hardware fracture or periprosthetic loosening.  - CXR 3/26/25 = No focal consolidation  - c/w Pain control w/ gabapentin   - Please refer to [facility] cardiac consultation note on 3/27/25 for preop evaluation on [facility].  - Orthopedics on board & will take patient to OR in AM  - NWB RLE, strict bed rest at this time  - BJKI for comfort while in bed    Hypokalemia  - replace w/ KCl    Chronic atrial fibrillation  - hold home Eliquis and will give therapeutic Lovenox   - c/w home diltiazem      Hypophosphatemia   - replace w/ PO Phos    HTN  - c/w home ramipril & Cardizem  - Per [place] note, "Pt reported not taking Torsemide for 11+ days d/t frequency of urination; Bilateral ankle/leg pitting edema/swelling ensued". She refused torsemide at [facility].  - resume torsemide     HLD   - c/w home atorvastatin    DM (diabetes mellitus).   - Home med januvia, glimepiride  - A1c 9.8 at [facility]  - c/w ISS + FS q6hrs    Prophylaxis:  DVT: Lovenox on hold for OR  GI: PO diet   
89F hard of hearing and uses hearing aid with PMHx of DM, chronic Afib on Eliquis, HTN, HLD, b/l hip replacements who was transferred from [facility] for w/ a right femoral fracture after a fall 3/26/25.     Right femoral fracture  - CT R femur 3/26/25 = Extensively comminuted fracture of the distal femur with intra-articular extension through the trochlear sulcus. Moderate knee lipohemarthrosis. Moderate soft tissue and muscular edema surrounding the fracture site. Moderate iliopsoas bursitis.  - Xray pelvis/R femur/R knee 3/26/25 = Mildly displaced, oblique distal right femur fracture. Bilateral total hip arthroplasties without hardware fracture or periprosthetic loosening.  - CXR 3/26/25 = No focal consolidation  - c/w Pain control w/ gabapentin   - Please refer to [facility] cardiac consultation note on 3/27/25 for preop evaluation on [facility].  - status post distal femur replacement on 3/31  - NWB RLE, strict bed rest at this time  - BJKI for comfort while in bed    Hypophosphatemia   - replace w/ Phos     Chronic atrial fibrillation  - hold home Eliquis on hold post OR, will restart when ok w/ ortho  - c/w home diltiazem      Hypophosphatemia   - replace w/ PO Phos    HTN  - c/w home ramipril & Cardizem  - Per [place] note, "Pt reported not taking Torsemide for 11+ days d/t frequency of urination; Bilateral ankle/leg pitting edema/swelling ensued". She refused torsemide at [facility].  - resume torsemide     HLD   - c/w home atorvastatin    DM (diabetes mellitus).   - Home med januvia, glimepiride  - A1c 9.8 at [facility]  - c/w ISS + FS q6hrs    Prophylaxis:  DVT: Lovenox on hold post OR, will restart when ok w/ ortho  GI: PO diet   
89F hard of hearing and uses hearing aid with PMHx of DM, chronic Afib on Eliquis, HTN, HLD, b/l hip replacements who was transferred from [facility] for w/ a right femoral fracture after a fall 3/26/25.     Right femoral fracture  - CT R femur 3/26/25 = Extensively comminuted fracture of the distal femur with intra-articular extension through the trochlear sulcus. Moderate knee lipohemarthrosis. Moderate soft tissue and muscular edema surrounding the fracture site. Moderate iliopsoas bursitis.  - Xray pelvis/R femur/R knee 3/26/25 = Mildly displaced, oblique distal right femur fracture. Bilateral total hip arthroplasties without hardware fracture or periprosthetic loosening.  - CXR 3/26/25 = No focal consolidation  - c/w Pain control w/ gabapentin   - Please refer to [facility] cardiac consultation note on 3/27/25 for preop evaluation on [facility].  - Orthopedics on board, but patient is refusing surgery at this time and will think about it over the weekend   - NWB RLE, strict bed rest at this time  - BJKI for comfort while in bed    Chronic atrial fibrillation  - hold home Eliquis and will give therapeutic Lovenox   - c/w home diltiazem      Hypophosphatemia   - replace w/ PO Phos    HTN  - c/w home ramipril & cardizem  - Per [place] note, "Pt reported not taking Torsemide for 11+ days d/t frequency of urination; Bilateral ankle/leg pitting edema/swelling ensued". She refused torsemide at [facility].  - resume torsemide     HLD   - c/w home atorvastatin    DM (diabetes mellitus).   - Home med januvia, glimepiride  - A1c 9.8 at [facility]  - c/w ISS + FS q6hrs    Prophylaxis:  DVT: Lovenox   GI: PO diet   
89F hard of hearing and uses hearing aid with PMHx of DM, chronic Afib on Eliquis, HTN, HLD, b/l hip replacements who was transferred from [facility] for w/ a right femoral fracture after a fall 3/26/25.     Right femoral fracture  - CT R femur 3/26/25 = Extensively comminuted fracture of the distal femur with intra-articular extension through the trochlear sulcus. Moderate knee lipohemarthrosis. Moderate soft tissue and muscular edema surrounding the fracture site. Moderate iliopsoas bursitis.  - Xray pelvis/R femur/R knee 3/26/25 = Mildly displaced, oblique distal right femur fracture. Bilateral total hip arthroplasties without hardware fracture or periprosthetic loosening.  - CXR 3/26/25 = No focal consolidation  - c/w Pain control w/ gabapentin   - status post distal femur replacement on 3/31  - WBAT RLE     DM (diabetes mellitus)  - hyperglycemia today is likely secondary to surgery - will give a one time dose of Lantus and control w/ sliding scale lispro as it is likely temporary - however if her FS remain elevated, will start standing insulin and consult endo   - Home med januvia, glimepiride  - A1c 9.3  - c/w ISS + FS q6hrs    Hypomagnesemia   - replace w/ IV Mg     Chronic atrial fibrillation  - as per my conversation w/ ortho, patient has been cleared to resume Eliquis   - c/w home diltiazem      HTN-- currently hypotensive, hold BP meds for now and give IV fluisd   - home ramipril & Cardizem  - Per [place] note, "Pt reported not taking Torsemide for 11+ days d/t frequency of urination; Bilateral ankle/leg pitting edema/swelling ensued". She refused torsemide at [facility].  -  torsemide     HLD   - c/w home atorvastatin    Prophylaxis:  DVT: Eliquis  GI: PO diet     Dispo: [facility]
89F hard of hearing and uses hearing aid with PMHx of DM, chronic Afib on Eliquis, HTN, HLD, b/l hip replacements who was transferred from [facility] for w/ a right femoral fracture after a fall 3/26/25.     Right femoral fracture  - CT R femur 3/26/25 = Extensively comminuted fracture of the distal femur with intra-articular extension through the trochlear sulcus. Moderate knee lipohemarthrosis. Moderate soft tissue and muscular edema surrounding the fracture site. Moderate iliopsoas bursitis.  - Xray pelvis/R femur/R knee 3/26/25 = Mildly displaced, oblique distal right femur fracture. Bilateral total hip arthroplasties without hardware fracture or periprosthetic loosening.  - CXR 3/26/25 = No focal consolidation  - c/w Pain control w/ gabapentin   - Please refer to [facility] cardiac consultation note on 3/27/25 for preop evaluation on [facility].  - status post distal femur replacement on 3/31  - NWB RLE, strict bed rest at this time  - BJKI for comfort while in bed      DM (diabetes mellitus)  - hyperglycemia today is likely secondary to surgery - will give a one time dose of Lantus and control w/ sliding scale lispro as it is likely temporary - however if her FS remain elevated, will start standing insulin and consult endo   - Home med januvia, glimepiride  - A1c 9.3  - c/w ISS + FS q6hrs    Hypomagnesemia   - replace w/ IV Mg     Chronic atrial fibrillation  - as per my conversation w/ ortho, patient has been cleared to resume Eliquis   - c/w home diltiazem      HTN  - c/w home ramipril & Cardizem  - Per [place] note, "Pt reported not taking Torsemide for 11+ days d/t frequency of urination; Bilateral ankle/leg pitting edema/swelling ensued". She refused torsemide at [facility].  - resume torsemide     HLD   - c/w home atorvastatin    Prophylaxis:  DVT: Eliquis  GI: PO diet     Dispo: [facility]

## 2025-04-02 NOTE — PROGRESS NOTE ADULT - NUTRITIONAL ASSESSMENT
This patient has been assessed with a concern for Malnutrition and has been determined to have a diagnosis/diagnoses of Moderate protein-calorie malnutrition.    This patient is being managed with:   Diet Consistent Carbohydrate/No Snacks-  Entered: Apr 1 2025  9:44PM  
This patient has been assessed with a concern for Malnutrition and has been determined to have a diagnosis/diagnoses of Moderate protein-calorie malnutrition.    This patient is being managed with:   Diet Regular-  Entered: Mar 31 2025  6:29PM  
This patient has been assessed with a concern for Malnutrition and has been determined to have a diagnosis/diagnoses of Moderate protein-calorie malnutrition.    This patient is being managed with:   Diet Regular-  Entered: Mar 31 2025  6:29PM

## 2025-04-03 ENCOUNTER — TRANSCRIPTION ENCOUNTER (OUTPATIENT)
Age: 89
End: 2025-04-03

## 2025-04-03 VITALS
DIASTOLIC BLOOD PRESSURE: 52 MMHG | RESPIRATION RATE: 17 BRPM | TEMPERATURE: 98 F | HEART RATE: 76 BPM | SYSTOLIC BLOOD PRESSURE: 108 MMHG | OXYGEN SATURATION: 96 %

## 2025-04-03 LAB
ANION GAP SERPL CALC-SCNC: 1 MMOL/L — LOW (ref 5–17)
BUN SERPL-MCNC: 29 MG/DL — HIGH (ref 7–23)
CALCIUM SERPL-MCNC: 8.4 MG/DL — LOW (ref 8.5–10.1)
CHLORIDE SERPL-SCNC: 99 MMOL/L — SIGNIFICANT CHANGE UP (ref 96–108)
CO2 SERPL-SCNC: 32 MMOL/L — HIGH (ref 22–31)
CREAT SERPL-MCNC: 0.69 MG/DL — SIGNIFICANT CHANGE UP (ref 0.5–1.3)
EGFR: 83 ML/MIN/1.73M2 — SIGNIFICANT CHANGE UP
EGFR: 83 ML/MIN/1.73M2 — SIGNIFICANT CHANGE UP
GLUCOSE BLDC GLUCOMTR-MCNC: 178 MG/DL — HIGH (ref 70–99)
GLUCOSE BLDC GLUCOMTR-MCNC: 279 MG/DL — HIGH (ref 70–99)
GLUCOSE SERPL-MCNC: 288 MG/DL — HIGH (ref 70–99)
HCT VFR BLD CALC: 26.8 % — LOW (ref 34.5–45)
HGB BLD-MCNC: 8.7 G/DL — LOW (ref 11.5–15.5)
MCHC RBC-ENTMCNC: 30.3 PG — SIGNIFICANT CHANGE UP (ref 27–34)
MCHC RBC-ENTMCNC: 32.5 G/DL — SIGNIFICANT CHANGE UP (ref 32–36)
MCV RBC AUTO: 93.4 FL — SIGNIFICANT CHANGE UP (ref 80–100)
NRBC BLD AUTO-RTO: 0 /100 WBCS — SIGNIFICANT CHANGE UP (ref 0–0)
PLATELET # BLD AUTO: 342 K/UL — SIGNIFICANT CHANGE UP (ref 150–400)
POTASSIUM SERPL-MCNC: 4.5 MMOL/L — SIGNIFICANT CHANGE UP (ref 3.5–5.3)
POTASSIUM SERPL-SCNC: 4.5 MMOL/L — SIGNIFICANT CHANGE UP (ref 3.5–5.3)
RBC # BLD: 2.87 M/UL — LOW (ref 3.8–5.2)
RBC # FLD: 16.2 % — HIGH (ref 10.3–14.5)
SODIUM SERPL-SCNC: 132 MMOL/L — LOW (ref 135–145)
WBC # BLD: 13.79 K/UL — HIGH (ref 3.8–10.5)
WBC # FLD AUTO: 13.79 K/UL — HIGH (ref 3.8–10.5)

## 2025-04-03 PROCEDURE — 99239 HOSP IP/OBS DSCHRG MGMT >30: CPT

## 2025-04-03 RX ORDER — TORSEMIDE 10 MG
1 TABLET ORAL
Refills: 0 | DISCHARGE

## 2025-04-03 RX ORDER — SENNA 187 MG
2 TABLET ORAL
Qty: 0 | Refills: 0 | DISCHARGE
Start: 2025-04-03

## 2025-04-03 RX ORDER — GABAPENTIN 400 MG/1
2 CAPSULE ORAL
Qty: 0 | Refills: 0 | DISCHARGE

## 2025-04-03 RX ORDER — TORSEMIDE 10 MG
1 TABLET ORAL
Qty: 0 | Refills: 0 | DISCHARGE
Start: 2025-04-03

## 2025-04-03 RX ADMIN — Medication 40 MILLIGRAM(S): at 06:30

## 2025-04-03 RX ADMIN — INSULIN LISPRO 2: 100 INJECTION, SOLUTION INTRAVENOUS; SUBCUTANEOUS at 08:23

## 2025-04-03 RX ADMIN — APIXABAN 2.5 MILLIGRAM(S): 2.5 TABLET, FILM COATED ORAL at 10:33

## 2025-04-03 RX ADMIN — DILTIAZEM HYDROCHLORIDE 240 MILLIGRAM(S): 240 TABLET, EXTENDED RELEASE ORAL at 06:30

## 2025-04-03 RX ADMIN — GABAPENTIN 200 MILLIGRAM(S): 400 CAPSULE ORAL at 05:28

## 2025-04-03 RX ADMIN — LISINOPRIL 40 MILLIGRAM(S): 5 TABLET ORAL at 05:28

## 2025-04-03 RX ADMIN — Medication 975 MILLIGRAM(S): at 05:35

## 2025-04-03 RX ADMIN — APIXABAN 2.5 MILLIGRAM(S): 2.5 TABLET, FILM COATED ORAL at 05:35

## 2025-04-03 NOTE — PROGRESS NOTE ADULT - PROVIDER SPECIALTY LIST ADULT
Hospitalist
Orthopedics
Hospitalist
Orthopedics
Hospitalist
Hospitalist

## 2025-04-03 NOTE — PROGRESS NOTE ADULT - REASON FOR ADMISSION
Femur fracture
Ozempic PA was denied, did you want to try and Appeal or change medication  
Femur fracture

## 2025-04-03 NOTE — DISCHARGE NOTE NURSING/CASE MANAGEMENT/SOCIAL WORK - FINANCIAL ASSISTANCE
[facility] provides services at a reduced cost to those who are determined to be eligible through [facility]’s financial assistance program. Information regarding [facility]’s financial assistance program can be found by going to [email or web address] or by calling [phone].

## 2025-04-03 NOTE — PROGRESS NOTE ADULT - SUBJECTIVE AND OBJECTIVE BOX
Patient seen and examined at bedside.  No acute complaints at this time. Pain well controlled. Denies chest pain, shortness of breath, nausea or vomiting.       VITAL SIGNS:  T(C): 36.4 (04-03-25 @ 05:06), Max: 36.9 (04-02-25 @ 16:56)  HR: 66 (04-03-25 @ 05:06) (61 - 98)  BP: 120/67 (04-03-25 @ 05:06) (88/45 - 120/67)  RR: 17 (04-03-25 @ 05:06) (17 - 18)  SpO2: 98% (04-03-25 @ 05:06) (92% - 98%)      PE:  General: NAD, resting comfortably in bed  RLE:   CHERRY Dressing in place C/D/I  No calf tenderness   compartments soft  Motor: + EHL/FHL/TA/GSC  +SILT: SPN/DPN/[place]/Saph/Tib  DP/PT palpable    LABS:                        9.7    15.73 )-----------( 286      ( 02 Apr 2025 08:20 )             29.3     04-02    130[L]  |  95[L]  |  35[H]  ----------------------------<  176[H]  4.9   |  31  |  0.86    Ca    8.7      02 Apr 2025 08:20  Phos  2.3     04-02  Mg     1.9     04-02    TPro  5.3[L]  /  Alb  1.6[L]  /  TBili  0.5  /  DBili  x   /  AST  15  /  ALT  6[L]  /  AlkPhos  95  04-02      Urinalysis Basic - ( 02 Apr 2025 08:20 )    Color: x / Appearance: x / SG: x / pH: x  Gluc: 176 mg/dL / Ketone: x  / Bili: x / Urobili: x   Blood: x / Protein: x / Nitrite: x   Leuk Esterase: x / RBC: x / WBC x   Sq Epi: x / Non Sq Epi: x / Bacteria: x      A/P:  89y f s/p R DFR POD 3  -PT/OT  -WBAT on the RLE  -Pain Control  -DVT ppx: eliquis  -FU AM Labs  -Rest, ice, compress and elevate the extremity as needed  -Incentive Spirometry  -Medical comanagement appreciated  -Dispo: [facility], ortho stable for DC planning  -will discuss with Dr. De La O and advise with changes to plan

## 2025-04-09 DIAGNOSIS — E87.6 HYPOKALEMIA: ICD-10-CM

## 2025-04-09 DIAGNOSIS — M25.061 HEMARTHROSIS, RIGHT KNEE: ICD-10-CM

## 2025-04-09 DIAGNOSIS — S72.401A UNSPECIFIED FRACTURE OF LOWER END OF RIGHT FEMUR, INITIAL ENCOUNTER FOR CLOSED FRACTURE: ICD-10-CM

## 2025-04-09 DIAGNOSIS — Z79.84 LONG TERM (CURRENT) USE OF ORAL HYPOGLYCEMIC DRUGS: ICD-10-CM

## 2025-04-09 DIAGNOSIS — I10 ESSENTIAL (PRIMARY) HYPERTENSION: ICD-10-CM

## 2025-04-09 DIAGNOSIS — Z66 DO NOT RESUSCITATE: ICD-10-CM

## 2025-04-09 DIAGNOSIS — E11.65 TYPE 2 DIABETES MELLITUS WITH HYPERGLYCEMIA: ICD-10-CM
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
## 2025-04-09 DIAGNOSIS — Z79.01 LONG TERM (CURRENT) USE OF ANTICOAGULANTS: ICD-10-CM

## 2025-04-09 DIAGNOSIS — S72.461A DISPLACED SUPRACONDYLAR FRACTURE WITH INTRACONDYLAR EXTENSION OF LOWER END OF RIGHT FEMUR, INITIAL ENCOUNTER FOR CLOSED FRACTURE: ICD-10-CM

## 2025-04-09 DIAGNOSIS — Z88.2 ALLERGY STATUS TO SULFONAMIDES: ICD-10-CM
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
## 2025-04-09 DIAGNOSIS — I48.20 CHRONIC ATRIAL FIBRILLATION, UNSPECIFIED: ICD-10-CM

## 2025-04-09 DIAGNOSIS — E83.39 OTHER DISORDERS OF PHOSPHORUS METABOLISM: ICD-10-CM

## 2025-04-09 DIAGNOSIS — E44.0 MODERATE PROTEIN-CALORIE MALNUTRITION: ICD-10-CM

## 2025-04-09 DIAGNOSIS — Z96.643 PRESENCE OF ARTIFICIAL HIP JOINT, BILATERAL: ICD-10-CM

## 2025-04-09 DIAGNOSIS — E78.5 HYPERLIPIDEMIA, UNSPECIFIED: ICD-10-CM
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
## 2025-04-09 DIAGNOSIS — D62 ACUTE POSTHEMORRHAGIC ANEMIA: ICD-10-CM

## 2025-04-09 DIAGNOSIS — E83.42 HYPOMAGNESEMIA: ICD-10-CM

## 2025-04-09 PROBLEM — I48.91 UNSPECIFIED ATRIAL FIBRILLATION: Chronic | Status: ACTIVE | Noted: 2025-03-28

## 2025-04-09 PROBLEM — E11.9 TYPE 2 DIABETES MELLITUS WITHOUT COMPLICATIONS: Chronic | Status: ACTIVE | Noted: 2025-03-28

## 2025-04-24 ENCOUNTER — APPOINTMENT (OUTPATIENT)
Dept: ORTHOPEDIC SURGERY | Facility: CLINIC | Age: 89
End: 2025-04-24
Payer: MEDICARE

## 2025-04-24 DIAGNOSIS — Z96.651 PRESENCE OF RIGHT ARTIFICIAL KNEE JOINT: ICD-10-CM

## 2025-04-24 DIAGNOSIS — M79.606 PAIN IN LEG, UNSPECIFIED: ICD-10-CM

## 2025-04-24 DIAGNOSIS — E78.00 PURE HYPERCHOLESTEROLEMIA, UNSPECIFIED: ICD-10-CM

## 2025-04-24 DIAGNOSIS — I10 ESSENTIAL (PRIMARY) HYPERTENSION: ICD-10-CM

## 2025-04-24 DIAGNOSIS — E11.9 TYPE 2 DIABETES MELLITUS W/OUT COMPLICATIONS: ICD-10-CM

## 2025-04-24 DIAGNOSIS — Z78.9 OTHER SPECIFIED HEALTH STATUS: ICD-10-CM

## 2025-04-24 PROCEDURE — 99024 POSTOP FOLLOW-UP VISIT: CPT

## 2025-04-24 PROCEDURE — 73552 X-RAY EXAM OF FEMUR 2/>: CPT | Mod: RT

## 2025-04-24 RX ORDER — TORSEMIDE 20 MG/1
20 TABLET ORAL
Refills: 0 | Status: ACTIVE | COMMUNITY

## 2025-04-24 RX ORDER — ATORVASTATIN CALCIUM 80 MG/1
TABLET, FILM COATED ORAL
Refills: 0 | Status: ACTIVE | COMMUNITY

## 2025-04-24 RX ORDER — GLIMEPIRIDE 2 MG/1
2 TABLET ORAL
Refills: 0 | Status: ACTIVE | COMMUNITY

## 2025-04-24 RX ORDER — GABAPENTIN 100 MG
100 TABLET ORAL
Refills: 0 | Status: ACTIVE | COMMUNITY

## 2025-04-24 RX ORDER — RAMIPRIL 10 MG/1
10 CAPSULE ORAL
Refills: 0 | Status: ACTIVE | COMMUNITY

## 2025-05-22 ENCOUNTER — APPOINTMENT (OUTPATIENT)
Dept: ORTHOPEDIC SURGERY | Facility: CLINIC | Age: 89
End: 2025-05-22
Payer: MEDICARE

## 2025-05-22 DIAGNOSIS — Z96.651 PRESENCE OF RIGHT ARTIFICIAL KNEE JOINT: ICD-10-CM

## 2025-05-22 PROCEDURE — 99024 POSTOP FOLLOW-UP VISIT: CPT

## 2025-05-22 PROCEDURE — 73562 X-RAY EXAM OF KNEE 3: CPT | Mod: RT

## 2025-07-16 PROBLEM — S72.90XA FEMORAL FRACTURE: Status: ACTIVE | Noted: 2025-07-16

## 2025-08-28 ENCOUNTER — APPOINTMENT (OUTPATIENT)
Dept: ORTHOPEDIC SURGERY | Facility: CLINIC | Age: 89
End: 2025-08-28
Payer: MEDICARE

## 2025-08-28 DIAGNOSIS — Z96.651 PRESENCE OF RIGHT ARTIFICIAL KNEE JOINT: ICD-10-CM

## 2025-08-28 PROCEDURE — 73562 X-RAY EXAM OF KNEE 3: CPT | Mod: RT

## 2025-08-28 PROCEDURE — 99213 OFFICE O/P EST LOW 20 MIN: CPT

## (undated) DEVICE — SUT VICRYL 0 36" CT-1 UNDYED

## (undated) DEVICE — WOUND IRR IRRISEPT W 0.5 CHG

## (undated) DEVICE — DRSG ACE BANDAGE 6"

## (undated) DEVICE — HOOD T5 PEELAWAY

## (undated) DEVICE — NDL HYPO SAFE 18G X 1.5" (PINK)

## (undated) DEVICE — SOL IRR BAG NS 0.9% 3000ML

## (undated) DEVICE — ELCTR VALLEYLAB PENCIL SMOKE W/ EDGE ELECTRODE 10FT

## (undated) DEVICE — DRILL BIT STRYKER FLUTTED PRESSFIT

## (undated) DEVICE — MAKO DRAPE KIT

## (undated) DEVICE — STRYKER MIXEVAC 3 BONE CEMENT MIXER

## (undated) DEVICE — ZIMMER PULSAVAC PLUS FAN KIT

## (undated) DEVICE — ELCTR AQUAMANTYS BIPOLAR SEALER 6.0

## (undated) DEVICE — MAKO BLADE STANDARD

## (undated) DEVICE — PACK TOTAL KNEE

## (undated) DEVICE — MAKO CHECKPOINT KIT FEMORAL / TIBIAL

## (undated) DEVICE — SYR ASEPTO

## (undated) DEVICE — SAW SAGITTAL 2108 SERIES 20.5X1.27X85MM

## (undated) DEVICE — SUT STRATAFIX SPIRAL PDS PLUS 2-0 45CM CT-1

## (undated) DEVICE — SYR LUER LOK 20CC

## (undated) DEVICE — DRSG PICO NPWT 4X12"

## (undated) DEVICE — POSITIONER FOAM BUMP FLAT TOP 10X6X4" LRG

## (undated) DEVICE — Device

## (undated) DEVICE — CRYO/CUFF GRAVITY COOLER KNEE LARGE

## (undated) DEVICE — FRA-ESU BOVIE FORCE TRIAD T7J19717DX: Type: DURABLE MEDICAL EQUIPMENT

## (undated) DEVICE — SUT PDO 2 1/2 CIRCLE 40MM NDL 45CM

## (undated) DEVICE — MAKO VIZADISC KNEE TRACKING KIT

## (undated) DEVICE — MEDICATION LABELS W MARKER

## (undated) DEVICE — SAW BLADE STRYKER SAGITTAL EXTRA WIDE THIN SHORT

## (undated) DEVICE — SUT STRATAFIX SPIRAL MONOCRYL PLUS 4-0 45CM PS-2 UNDYED

## (undated) DEVICE — GOWN IMPERV BREATHABLE XL

## (undated) DEVICE — HOOD FLYTE STRYKER HELMET SHIELD

## (undated) DEVICE — MAKO BLADE NARROW

## (undated) DEVICE — DRSG COBAN 6"